# Patient Record
Sex: MALE | Race: WHITE | NOT HISPANIC OR LATINO | Employment: FULL TIME
[De-identification: names, ages, dates, MRNs, and addresses within clinical notes are randomized per-mention and may not be internally consistent; named-entity substitution may affect disease eponyms.]

---

## 2019-10-04 ENCOUNTER — HEALTH MAINTENANCE LETTER (OUTPATIENT)
Age: 49
End: 2019-10-04

## 2020-11-08 ENCOUNTER — HEALTH MAINTENANCE LETTER (OUTPATIENT)
Age: 50
End: 2020-11-08

## 2021-09-11 ENCOUNTER — HEALTH MAINTENANCE LETTER (OUTPATIENT)
Age: 51
End: 2021-09-11

## 2021-12-14 ENCOUNTER — IMMUNIZATION (OUTPATIENT)
Dept: NURSING | Facility: CLINIC | Age: 51
End: 2021-12-14
Payer: COMMERCIAL

## 2021-12-14 PROCEDURE — 91300 PR COVID VAC PFIZER DIL RECON 30 MCG/0.3 ML IM: CPT

## 2021-12-14 PROCEDURE — 90662 IIV NO PRSV INCREASED AG IM: CPT

## 2021-12-14 PROCEDURE — 90471 IMMUNIZATION ADMIN: CPT

## 2021-12-14 PROCEDURE — 0004A PR COVID VAC PFIZER DIL RECON 30 MCG/0.3 ML IM: CPT

## 2022-01-01 ENCOUNTER — HEALTH MAINTENANCE LETTER (OUTPATIENT)
Age: 52
End: 2022-01-01

## 2022-06-03 ENCOUNTER — IMMUNIZATION (OUTPATIENT)
Dept: NURSING | Facility: CLINIC | Age: 52
End: 2022-06-03
Payer: COMMERCIAL

## 2022-06-03 PROCEDURE — 0064A COVID-19,PF,MODERNA (18+ YRS BOOSTER .25ML): CPT

## 2022-06-03 PROCEDURE — 91306 COVID-19,PF,MODERNA (18+ YRS BOOSTER .25ML): CPT

## 2022-09-20 ENCOUNTER — E-VISIT (OUTPATIENT)
Dept: FAMILY MEDICINE | Facility: CLINIC | Age: 52
End: 2022-09-20
Payer: COMMERCIAL

## 2022-09-20 DIAGNOSIS — U07.1 SARS-COV-2 POSITIVE: Primary | ICD-10-CM

## 2022-09-20 PROCEDURE — 99207 PR NON-BILLABLE SERV PER CHARTING: CPT | Performed by: INTERNAL MEDICINE

## 2022-09-20 NOTE — TELEPHONE ENCOUNTER
Provider E-Visit time total (minutes): I spoke to the patient  All symptoms are improving  I explained to them that sometimes people continue to test positive for long time  So we go by his symptoms and not with the test when isolation question comes  As long his it has been more than 5 days and he is fever free for 24 hours without taking any fever lowering medication  Then he can discontinue isolation and wear mask for another 5 days  He is not having any fever and and all his symptoms are mild  This is a post viral syndrome  Dr.Nasima Amalia MD

## 2022-09-20 NOTE — PATIENT INSTRUCTIONS
Dear Tremaine,      Based on your responses, you may have coronavirus (COVID-19).     Will I be tested for COVID-19?  We would like to test you for COVID. I have placed orders for this test.     To schedule: go to your Appear home page and scroll down to the section that says  You have an appointment that needs to be scheduled  and click the large green button that says  Schedule Now  and follow the steps to find the next available openings.    If you are unable to complete these Appear scheduling steps, please call [phone] to schedule your testing.     These guidelines are for isolating before returning to work, school or .     For employers, schools and day cares: This is an official notice for this person s medical guidelines for returning in-person.     For health care sites: The CDC gives different isolation and quarantine guidelines for healthcare sites, please check with these sites before arriving.     How do I self-isolate?  You isolate when you have symptoms of COVID or a test shows you have COVID, even if you don t have symptoms.     If you DO have symptoms:  o Stay home and away from others  - For at least 5 days after your symptoms started, AND   - You are fever free for 24 hours (with no medicine that reduces fever), AND  - Your other symptoms are better.  o Wear a mask for 10 full days any time you are around others.    If you DON T have symptoms:  o Stay at home and away from others for at least 5 days after your positive test.  o Wear a mask for 10 full days any time you are around others.    How can I take care of myself?  Over the counter medications may help with your symptoms such as runny or stuffy nose, cough, chills, or fever.  Talk to your care team about your options.     Some people are at high risk of severe illness (for example, you have a weak immune system, you re 65 years or older, or you have certain medical problems). If your risk is high and your symptoms started in the  last 5 to 7 days, we strongly recommend for you to get COVID treatment as soon as possible. Paxlovid, Molnupiravir and the monoclonal antibody treatments are proven safe and effective, make you feel better faster, and prevent hospitalization and death.       To schedule an appointment to discuss COVID treatment, request an appointment on MyChart (select  COVID-19 Treatment ) or call [phone]), press [phone].      Get lots of rest. Drink extra fluids (unless a doctor has told you not to)    Take Tylenol (acetaminophen) or ibuprofen for fever or pain. If you have liver or kidney problems, ask your family doctor if it's okay to take Tylenol or ibuprofen    Take over the counter medications for your symptoms, as directed by your doctor. You may also talk to your pharmacist.      If you have other health problems (like cancer, heart failure, an organ transplant or severe kidney disease): Call your specialty clinic if you don't feel better in the next 2 days.    Know when to call 911. Emergency warning signs include:  o Trouble breathing or shortness of breath  o Pain or pressure in the chest that doesn't go away  o Feeling confused like you haven't felt before, or not being able to wake up  o Bluish-colored lips or face    Where can I get more information?    [facility] - About COVID-19: [email or web address]     CDC - What to Do If You're Sick: https://www.cdc.gov/coronavirus/2019-ncov/if-you-are-sick/index.html     CDC - Quarantine & Isolation: https://www.cdc.gov/coronavirus/2019-ncov/your-health/quarantine-isolation.html     [facility] clinical trials (COVID-19 research studies): [email or web address]    Below are the COVID-19 hotlines at the [facility] Health ([facility]). Interpreters are available.  o For health questions: Call [phone] or [phone] (7 a.m. to 7 p.m.)  o For questions about schools and childcare: Call [phone] or  [phone] (7 a.m. to 7 p.m.)

## 2022-10-29 ENCOUNTER — HEALTH MAINTENANCE LETTER (OUTPATIENT)
Age: 52
End: 2022-10-29

## 2022-12-03 ASSESSMENT — ENCOUNTER SYMPTOMS
FEVER: 0
WEAKNESS: 0
HEMATURIA: 0
EYE PAIN: 0
FREQUENCY: 0
CONSTIPATION: 0
HEARTBURN: 0
SHORTNESS OF BREATH: 0
PALPITATIONS: 0
DIARRHEA: 0
JOINT SWELLING: 0
HEMATOCHEZIA: 0
CHILLS: 0
DYSURIA: 0
PARESTHESIAS: 0
NERVOUS/ANXIOUS: 0
ARTHRALGIAS: 0
DIZZINESS: 0
HEADACHES: 0
MYALGIAS: 0
ABDOMINAL PAIN: 1
SORE THROAT: 0
COUGH: 0
NAUSEA: 0

## 2022-12-08 ENCOUNTER — OFFICE VISIT (OUTPATIENT)
Dept: FAMILY MEDICINE | Facility: CLINIC | Age: 52
End: 2022-12-08
Payer: COMMERCIAL

## 2022-12-08 VITALS
WEIGHT: 232 LBS | DIASTOLIC BLOOD PRESSURE: 86 MMHG | HEIGHT: 73 IN | HEART RATE: 72 BPM | BODY MASS INDEX: 30.75 KG/M2 | TEMPERATURE: 97.7 F | SYSTOLIC BLOOD PRESSURE: 132 MMHG | OXYGEN SATURATION: 100 % | RESPIRATION RATE: 16 BRPM

## 2022-12-08 DIAGNOSIS — Z00.00 ROUTINE HISTORY AND PHYSICAL EXAMINATION OF ADULT: Primary | ICD-10-CM

## 2022-12-08 DIAGNOSIS — Z11.4 SCREENING FOR HIV (HUMAN IMMUNODEFICIENCY VIRUS): ICD-10-CM

## 2022-12-08 DIAGNOSIS — Z12.11 SCREEN FOR COLON CANCER: ICD-10-CM

## 2022-12-08 DIAGNOSIS — Z11.59 NEED FOR HEPATITIS C SCREENING TEST: ICD-10-CM

## 2022-12-08 DIAGNOSIS — Z13.220 SCREENING FOR HYPERLIPIDEMIA: ICD-10-CM

## 2022-12-08 DIAGNOSIS — E78.5 HYPERLIPIDEMIA LDL GOAL <160: ICD-10-CM

## 2022-12-08 DIAGNOSIS — Z23 INFLUENZA VACCINE NEEDED: ICD-10-CM

## 2022-12-08 LAB
ANION GAP SERPL CALCULATED.3IONS-SCNC: 13 MMOL/L (ref 7–15)
BASOPHILS # BLD AUTO: 0 10E3/UL (ref 0–0.2)
BASOPHILS NFR BLD AUTO: 1 %
BUN SERPL-MCNC: 13.2 MG/DL (ref 6–20)
CALCIUM SERPL-MCNC: 9.7 MG/DL (ref 8.6–10)
CHLORIDE SERPL-SCNC: 104 MMOL/L (ref 98–107)
CHOLEST SERPL-MCNC: 247 MG/DL
CREAT SERPL-MCNC: 1.14 MG/DL (ref 0.67–1.17)
DEPRECATED HCO3 PLAS-SCNC: 23 MMOL/L (ref 22–29)
EOSINOPHIL # BLD AUTO: 0.1 10E3/UL (ref 0–0.7)
EOSINOPHIL NFR BLD AUTO: 1 %
ERYTHROCYTE [DISTWIDTH] IN BLOOD BY AUTOMATED COUNT: 13.3 % (ref 10–15)
GFR SERPL CREATININE-BSD FRML MDRD: 78 ML/MIN/1.73M2
GLUCOSE SERPL-MCNC: 95 MG/DL (ref 70–99)
HBA1C MFR BLD: 5.3 % (ref 0–5.6)
HCT VFR BLD AUTO: 46.5 % (ref 40–53)
HCV AB SERPL QL IA: NONREACTIVE
HDLC SERPL-MCNC: 41 MG/DL
HGB BLD-MCNC: 15.6 G/DL (ref 13.3–17.7)
HIV 1+2 AB+HIV1 P24 AG SERPL QL IA: NONREACTIVE
IMM GRANULOCYTES # BLD: 0 10E3/UL
IMM GRANULOCYTES NFR BLD: 0 %
LDLC SERPL CALC-MCNC: 187 MG/DL
LYMPHOCYTES # BLD AUTO: 1 10E3/UL (ref 0.8–5.3)
LYMPHOCYTES NFR BLD AUTO: 25 %
MCH RBC QN AUTO: 30.2 PG (ref 26.5–33)
MCHC RBC AUTO-ENTMCNC: 33.5 G/DL (ref 31.5–36.5)
MCV RBC AUTO: 90 FL (ref 78–100)
MONOCYTES # BLD AUTO: 0.5 10E3/UL (ref 0–1.3)
MONOCYTES NFR BLD AUTO: 11 %
NEUTROPHILS # BLD AUTO: 2.4 10E3/UL (ref 1.6–8.3)
NEUTROPHILS NFR BLD AUTO: 62 %
NONHDLC SERPL-MCNC: 206 MG/DL
PLATELET # BLD AUTO: 249 10E3/UL (ref 150–450)
POTASSIUM SERPL-SCNC: 4.4 MMOL/L (ref 3.4–5.3)
PSA SERPL-MCNC: 0.84 NG/ML (ref 0–3.5)
RBC # BLD AUTO: 5.16 10E6/UL (ref 4.4–5.9)
SODIUM SERPL-SCNC: 140 MMOL/L (ref 136–145)
TRIGL SERPL-MCNC: 95 MG/DL
TSH SERPL DL<=0.005 MIU/L-ACNC: 1.32 UIU/ML (ref 0.3–4.2)
WBC # BLD AUTO: 4 10E3/UL (ref 4–11)

## 2022-12-08 PROCEDURE — 86803 HEPATITIS C AB TEST: CPT | Performed by: INTERNAL MEDICINE

## 2022-12-08 PROCEDURE — 85025 COMPLETE CBC W/AUTO DIFF WBC: CPT | Performed by: INTERNAL MEDICINE

## 2022-12-08 PROCEDURE — 90682 RIV4 VACC RECOMBINANT DNA IM: CPT | Performed by: INTERNAL MEDICINE

## 2022-12-08 PROCEDURE — G0103 PSA SCREENING: HCPCS | Performed by: INTERNAL MEDICINE

## 2022-12-08 PROCEDURE — 83036 HEMOGLOBIN GLYCOSYLATED A1C: CPT | Performed by: INTERNAL MEDICINE

## 2022-12-08 PROCEDURE — 99386 PREV VISIT NEW AGE 40-64: CPT | Mod: 25 | Performed by: INTERNAL MEDICINE

## 2022-12-08 PROCEDURE — 87389 HIV-1 AG W/HIV-1&-2 AB AG IA: CPT | Performed by: INTERNAL MEDICINE

## 2022-12-08 PROCEDURE — 90471 IMMUNIZATION ADMIN: CPT | Performed by: INTERNAL MEDICINE

## 2022-12-08 PROCEDURE — 80061 LIPID PANEL: CPT | Performed by: INTERNAL MEDICINE

## 2022-12-08 PROCEDURE — 36415 COLL VENOUS BLD VENIPUNCTURE: CPT | Performed by: INTERNAL MEDICINE

## 2022-12-08 PROCEDURE — 84443 ASSAY THYROID STIM HORMONE: CPT | Performed by: INTERNAL MEDICINE

## 2022-12-08 PROCEDURE — 80048 BASIC METABOLIC PNL TOTAL CA: CPT | Performed by: INTERNAL MEDICINE

## 2022-12-08 ASSESSMENT — PAIN SCALES - GENERAL: PAINLEVEL: NO PAIN (0)

## 2022-12-08 NOTE — PROGRESS NOTES
SUBJECTIVE:   CC: Tremaine is an 52 year old who presents for preventative health visit.   Patient has been advised of split billing requirements and indicates understanding: Yes  Healthy Habits:     Getting at least 3 servings of Calcium per day:  Yes    Bi-annual eye exam:  Yes    Dental care twice a year:  Yes    Sleep apnea or symptoms of sleep apnea:  None    Diet:  Regular (no restrictions)    Frequency of exercise:  2-3 days/week    Duration of exercise:  15-30 minutes    Taking medications regularly:  Yes    Medication side effects:  None    PHQ-2 Total Score: 0    Additional concerns today:  No    Ability to successfully perform activities of daily living: Yes, no assistance needed  Home safety:  none identified   Hearing impairment: none      Today's PHQ-2 Score:   PHQ-2 ( 1999 Pfizer) 12/3/2022   Q1: Little interest or pleasure in doing things 0   Q2: Feeling down, depressed or hopeless 0   PHQ-2 Score 0   Q1: Little interest or pleasure in doing things Not at all   Q2: Feeling down, depressed or hopeless Not at all   PHQ-2 Score 0       Have you ever done Advance Care Planning? (For example, a Health Directive, POLST, or a discussion with a medical provider or your loved ones about your wishes): No, advance care planning information given to patient to review.  Patient plans to discuss their wishes with loved ones or provider.      Social History     Tobacco Use     Smoking status: Never     Smokeless tobacco: Never   Substance Use Topics     Alcohol use: Yes     Comment: occasionally     If you drink alcohol do you typically have >3 drinks per day or >7 drinks per week? No    Alcohol Use 12/3/2022   Prescreen: >3 drinks/day or >7 drinks/week? No   Prescreen: >3 drinks/day or >7 drinks/week? -   No flowsheet data found.    Last PSA:   PSA   Date Value Ref Range Status   10/08/2015 0.64 0 - 4 ug/L Final       Reviewed orders with patient. Reviewed health maintenance and updated orders accordingly - Yes  Labs  "reviewed in EPIC    Reviewed and updated as needed this visit by clinical staff                  Reviewed and updated as needed this visit by Provider                 Past Medical History:   Diagnosis Date     FAMILY HX CARDIOVAS DIS NEC 5/6/2006     FAMILY HX PROSTATIC MALIGNANCY 9/14/2007     Lumbago 5/6/2006     Mixed hyperlipidemia 5/6/2006     ORCHITIS/EPIDIDYMIT-MILD-RESOLVED 9/14/2007        Review of Systems  CONSTITUTIONAL: NEGATIVE for fever, chills, change in weight  INTEGUMENTARY/SKIN: NEGATIVE for worrisome rashes, moles or lesions  EYES: NEGATIVE for vision changes or irritation  ENT: NEGATIVE for ear, mouth and throat problems  RESP: NEGATIVE for significant cough or SOB  CV: NEGATIVE for chest pain, palpitations or peripheral edema  GI: NEGATIVE for nausea, abdominal pain, heartburn, or change in bowel habits   male: negative for dysuria, hematuria, decreased urinary stream, erectile dysfunction, urethral discharge  MUSCULOSKELETAL: NEGATIVE for significant arthralgias or myalgia  NEURO: NEGATIVE for weakness, dizziness or paresthesias  PSYCHIATRIC: NEGATIVE for changes in mood or affect    OBJECTIVE:   /86 (BP Location: Right arm, Patient Position: Sitting, Cuff Size: Adult Large)   Pulse 72   Temp 97.7  F (36.5  C) (Temporal)   Resp 16   Ht 1.842 m (6' 0.5\")   Wt 105.2 kg (232 lb)   SpO2 100%   BMI 31.03 kg/m      Physical Exam  GENERAL: alert and no distress  EYES: Eyes grossly normal to inspection, PERRL and conjunctivae and sclerae normal  HENT: ear canals and TM's normal, nose and mouth without ulcers or lesions  NECK: no adenopathy, no asymmetry, masses, or scars and thyroid normal to palpation  RESP: lungs clear to auscultation  CV: regular rate and rhythm  ABDOMEN: soft, nontender, no hepatosplenomegaly, no masses and bowel sounds normal  MS: no gross musculoskeletal defects noted, no edema  SKIN: no suspicious lesions or rashes  NEURO: Normal strength and tone, mentation " intact and speech normal  PSYCH: mentation appears normal, affect normal/bright    Diagnostic Test Results:  Labs reviewed in Epic    ASSESSMENT/PLAN:   Tremaine was seen today for physical.    Diagnoses and all orders for this visit:    Routine history and physical examination of adult  -     REVIEW OF HEALTH MAINTENANCE PROTOCOL ORDERS  -     PSA, screen; Future  -     TSH with free T4 reflex; Future  -     Hemoglobin A1c; Future  -     CBC with platelets and differential; Future  -     Basic metabolic panel  (Ca, Cl, CO2, Creat, Gluc, K, Na, BUN); Future  -     PSA, screen  -     TSH with free T4 reflex  -     Hemoglobin A1c  -     CBC with platelets and differential  -     Basic metabolic panel  (Ca, Cl, CO2, Creat, Gluc, K, Na, BUN)    Screen for colon cancer  -     Fecal colorectal cancer screen (FIT); Future  -     Fecal colorectal cancer screen (FIT)    Screening for HIV (human immunodeficiency virus)  -     HIV Antigen Antibody Combo; Future  -     HIV Antigen Antibody Combo    Need for hepatitis C screening test  -     Hepatitis C Screen Reflex to HCV RNA Quant and Genotype; Future  -     Hepatitis C Screen Reflex to HCV RNA Quant and Genotype    Screening for hyperlipidemia  -     Lipid panel reflex to direct LDL Non-fasting; Future  -     Lipid panel reflex to direct LDL Non-fasting    Hyperlipidemia LDL goal <160  -     Lipid panel reflex to direct LDL Non-fasting; Future  -     Lipid panel reflex to direct LDL Non-fasting    Influenza vaccine needed  -     INFLUENZA VACCINE 50-64 OR 18-64 W/EGG ALLERGY (FLUBLOK)        Patient has been advised of split billing requirements and indicates understanding: Yes      COUNSELING:   Reviewed preventive health counseling, as reflected in patient instructions  Special attention given to:        Regular exercise       Healthy diet/nutrition        He reports that he has never smoked. He has never used smokeless tobacco.        Jenn Herring MD  [facility]  [facility]

## 2023-01-06 ENCOUNTER — IMMUNIZATION (OUTPATIENT)
Dept: NURSING | Facility: CLINIC | Age: 53
End: 2023-01-06
Payer: COMMERCIAL

## 2023-01-06 PROCEDURE — 91312 COVID-19 VACCINE BIVALENT BOOSTER 12+ (PFIZER): CPT

## 2023-01-06 PROCEDURE — 0124A COVID-19 VACCINE BIVALENT BOOSTER 12+ (PFIZER): CPT

## 2023-11-08 ENCOUNTER — PATIENT OUTREACH (OUTPATIENT)
Dept: CARE COORDINATION | Facility: CLINIC | Age: 53
End: 2023-11-08
Payer: COMMERCIAL

## 2024-01-08 ENCOUNTER — IMMUNIZATION (OUTPATIENT)
Dept: NURSING | Facility: CLINIC | Age: 54
End: 2024-01-08
Payer: COMMERCIAL

## 2024-01-08 PROCEDURE — 90480 ADMN SARSCOV2 VAC 1/ONLY CMP: CPT

## 2024-01-08 PROCEDURE — 91320 SARSCV2 VAC 30MCG TRS-SUC IM: CPT

## 2024-01-08 PROCEDURE — 90686 IIV4 VACC NO PRSV 0.5 ML IM: CPT

## 2024-01-08 PROCEDURE — 90471 IMMUNIZATION ADMIN: CPT

## 2024-01-09 ASSESSMENT — ENCOUNTER SYMPTOMS
NAUSEA: 0
MYALGIAS: 0
FREQUENCY: 0
DYSURIA: 0
DIZZINESS: 0
COUGH: 0
WEAKNESS: 0
CHILLS: 0
JOINT SWELLING: 0
ARTHRALGIAS: 0
ABDOMINAL PAIN: 0
NERVOUS/ANXIOUS: 0
HEMATURIA: 0
CONSTIPATION: 0
DIARRHEA: 0
SORE THROAT: 0
PALPITATIONS: 0
PARESTHESIAS: 0
FEVER: 0
SHORTNESS OF BREATH: 0
HEADACHES: 0
HEMATOCHEZIA: 0
EYE PAIN: 0
HEARTBURN: 0

## 2024-01-16 ENCOUNTER — OFFICE VISIT (OUTPATIENT)
Dept: FAMILY MEDICINE | Facility: CLINIC | Age: 54
End: 2024-01-16
Payer: COMMERCIAL

## 2024-01-16 VITALS
WEIGHT: 195.6 LBS | HEIGHT: 72 IN | SYSTOLIC BLOOD PRESSURE: 128 MMHG | BODY MASS INDEX: 26.49 KG/M2 | HEART RATE: 64 BPM | RESPIRATION RATE: 18 BRPM | OXYGEN SATURATION: 99 % | DIASTOLIC BLOOD PRESSURE: 84 MMHG | TEMPERATURE: 97.2 F

## 2024-01-16 DIAGNOSIS — Z79.899 MEDICATION MANAGEMENT: ICD-10-CM

## 2024-01-16 DIAGNOSIS — Z12.5 SCREENING FOR PROSTATE CANCER: ICD-10-CM

## 2024-01-16 DIAGNOSIS — M21.42 FLAT FEET, BILATERAL: ICD-10-CM

## 2024-01-16 DIAGNOSIS — M21.41 FLAT FEET, BILATERAL: ICD-10-CM

## 2024-01-16 DIAGNOSIS — Z13.29 SCREENING FOR THYROID DISORDER: ICD-10-CM

## 2024-01-16 DIAGNOSIS — Z13.0 SCREENING FOR DEFICIENCY ANEMIA: ICD-10-CM

## 2024-01-16 DIAGNOSIS — Z23 NEED FOR VACCINATION: ICD-10-CM

## 2024-01-16 DIAGNOSIS — R03.0 ELEVATED BP WITHOUT DIAGNOSIS OF HYPERTENSION: ICD-10-CM

## 2024-01-16 DIAGNOSIS — D22.9 NUMEROUS MOLES: ICD-10-CM

## 2024-01-16 DIAGNOSIS — E78.5 HYPERLIPIDEMIA, UNSPECIFIED HYPERLIPIDEMIA TYPE: ICD-10-CM

## 2024-01-16 DIAGNOSIS — Z12.11 SCREEN FOR COLON CANCER: ICD-10-CM

## 2024-01-16 DIAGNOSIS — Z00.00 ROUTINE GENERAL MEDICAL EXAMINATION AT A HEALTH CARE FACILITY: Primary | ICD-10-CM

## 2024-01-16 DIAGNOSIS — Z80.42 FAMILY HISTORY OF MALIGNANT NEOPLASM OF PROSTATE: ICD-10-CM

## 2024-01-16 DIAGNOSIS — Z82.49 FAMILY HISTORY OF ISCHEMIC HEART DISEASE: ICD-10-CM

## 2024-01-16 LAB
ERYTHROCYTE [DISTWIDTH] IN BLOOD BY AUTOMATED COUNT: 13 % (ref 10–15)
HCT VFR BLD AUTO: 42.5 % (ref 40–53)
HGB BLD-MCNC: 14.1 G/DL (ref 13.3–17.7)
MCH RBC QN AUTO: 30.1 PG (ref 26.5–33)
MCHC RBC AUTO-ENTMCNC: 33.2 G/DL (ref 31.5–36.5)
MCV RBC AUTO: 91 FL (ref 78–100)
PLATELET # BLD AUTO: 238 10E3/UL (ref 150–450)
RBC # BLD AUTO: 4.69 10E6/UL (ref 4.4–5.9)
WBC # BLD AUTO: 5.3 10E3/UL (ref 4–11)

## 2024-01-16 PROCEDURE — 36415 COLL VENOUS BLD VENIPUNCTURE: CPT | Performed by: INTERNAL MEDICINE

## 2024-01-16 PROCEDURE — 90746 HEPB VACCINE 3 DOSE ADULT IM: CPT | Performed by: INTERNAL MEDICINE

## 2024-01-16 PROCEDURE — 85027 COMPLETE CBC AUTOMATED: CPT | Performed by: INTERNAL MEDICINE

## 2024-01-16 PROCEDURE — 80053 COMPREHEN METABOLIC PANEL: CPT | Performed by: INTERNAL MEDICINE

## 2024-01-16 PROCEDURE — 99214 OFFICE O/P EST MOD 30 MIN: CPT | Mod: 25 | Performed by: INTERNAL MEDICINE

## 2024-01-16 PROCEDURE — 84443 ASSAY THYROID STIM HORMONE: CPT | Performed by: INTERNAL MEDICINE

## 2024-01-16 PROCEDURE — 80061 LIPID PANEL: CPT | Performed by: INTERNAL MEDICINE

## 2024-01-16 PROCEDURE — 99396 PREV VISIT EST AGE 40-64: CPT | Mod: 25 | Performed by: INTERNAL MEDICINE

## 2024-01-16 PROCEDURE — G0103 PSA SCREENING: HCPCS | Performed by: INTERNAL MEDICINE

## 2024-01-16 PROCEDURE — 90471 IMMUNIZATION ADMIN: CPT | Performed by: INTERNAL MEDICINE

## 2024-01-16 ASSESSMENT — ENCOUNTER SYMPTOMS
ARTHRALGIAS: 0
SHORTNESS OF BREATH: 0
EYE PAIN: 0
ABDOMINAL PAIN: 0
HEMATOCHEZIA: 0
COUGH: 0
SORE THROAT: 0
WEAKNESS: 0
DIARRHEA: 0
DIZZINESS: 0
FEVER: 0
HEMATURIA: 0
HEARTBURN: 0
JOINT SWELLING: 0
DYSURIA: 0
PARESTHESIAS: 0
CONSTIPATION: 0
FREQUENCY: 0
MYALGIAS: 0
CHILLS: 0
NERVOUS/ANXIOUS: 0
NAUSEA: 0
HEADACHES: 0
PALPITATIONS: 0

## 2024-01-16 ASSESSMENT — PAIN SCALES - GENERAL: PAINLEVEL: NO PAIN (0)

## 2024-01-16 NOTE — NURSING NOTE
Prior to immunization administration, verified patients identity using patient s name and date of birth. Please see Immunization Activity for additional information.     Screening Questionnaire for Adult Immunization    Are you sick today?   No   Do you have allergies to medications, food, a vaccine component or latex?   No   Have you ever had a serious reaction after receiving a vaccination?   No   Do you have a long-term health problem with heart, lung, kidney, or metabolic disease (e.g., diabetes), asthma, a blood disorder, no spleen, complement component deficiency, a cochlear implant, or a spinal fluid leak?  Are you on long-term aspirin therapy?   No   Do you have cancer, leukemia, HIV/AIDS, or any other immune system problem?   No   Do you have a parent, brother, or sister with an immune system problem?   No   In the past 3 months, have you taken medications that affect  your immune system, such as prednisone, other steroids, or anticancer drugs; drugs for the treatment of rheumatoid arthritis, Crohn s disease, or psoriasis; or have you had radiation treatments?   No   Have you had a seizure, or a brain or other nervous system problem?   No   During the past year, have you received a transfusion of blood or blood    products, or been given immune (gamma) globulin or antiviral drug?   No   For women: Are you pregnant or is there a chance you could become       pregnant during the next month?   No   Have you received any vaccinations in the past 4 weeks?   Yes     Immunization questionnaire was positive for at least one answer.  Notified .      Patient instructed to remain in clinic for 15 minutes afterwards, and to report any adverse reactions.     Screening performed by Fannie Mosqueda MA on 1/16/2024 at 3:00 PM.

## 2024-01-16 NOTE — PATIENT INSTRUCTIONS
Schedule colonoscopy at your earliest convenience by calling the following number:  Nga  :[phone].    There is a new shingles vaccine available called shingrex  It is a series of 2 shots 2-6 months apart.  Considered more than 90% effective.  Please go to any pharmacy to get the  vaccine    Monitor your blood pressure once a week  at home.  Bring those readings on your next visit.  Notify us if your blood pressure readings consistently stays greater than 140/90.       Please call [place] kristian  [phone] to schedule coronary calcium scoring test     Follow up in one year for physical   Seek sooner medical attention if there is any worsening of symptoms or problems.       Preventive Health Recommendations  Male Ages 50 - 64    Yearly exam:             See your health care provider every year in order to  o   Review health changes.   o   Discuss preventive care.    o   Review your medicines if your doctor has prescribed any.   Have a cholesterol test every 5 years, or more frequently if you are at risk for high cholesterol/heart disease.   Have a diabetes test (fasting glucose) every three years. If you are at risk for diabetes, you should have this test more often.   Have a colonoscopy at age 45, or have a yearly FIT test (stool test). These exams will check for colon cancer.    Talk with your health care provider about whether or not a prostate cancer screening test (PSA) is right for you.  You should be tested each year for STDs (sexually transmitted diseases), if you re at risk.     Shots: Get a flu shot each year. Get a tetanus shot every 10 years.     Nutrition:  Eat at least 5 servings of fruits and vegetables daily.   Eat whole-grain bread, whole-wheat pasta and brown rice instead of white grains and rice.   Get adequate Calcium and Vitamin D.     Lifestyle  Exercise for at least 150 minutes a week (30 minutes a day, 5 days a week). This will help you control your weight and  prevent disease.   Limit alcohol to one drink per day.   No smoking.   Wear sunscreen to prevent skin cancer.   See your dentist every six months for an exam and cleaning.   See your eye doctor every 1 to 2 years.

## 2024-01-16 NOTE — PROGRESS NOTES
SUBJECTIVE:   Tremaine is a 53 year old, presenting for the following:  Physical    Healthy Habits:     Getting at least 3 servings of Calcium per day:  Yes    Bi-annual eye exam:  Yes    Dental care twice a year:  Yes    Sleep apnea or symptoms of sleep apnea:  None    Diet:  Regular (no restrictions)    Frequency of exercise:  6-7 days/week    Duration of exercise:  45-60 minutes    Taking medications regularly:  Yes    Medication side effects:  None    Additional concerns today:  No      Today's PHQ-2 Score:       1/15/2024     2:50 PM   PHQ-2 ( 1999 Pfizer)   Q1: Little interest or pleasure in doing things 0   Q2: Feeling down, depressed or hopeless 0   PHQ-2 Score 0   Q1: Little interest or pleasure in doing things Not at all   Q2: Feeling down, depressed or hopeless Not at all   PHQ-2 Score 0         Social History     Tobacco Use    Smoking status: Never    Smokeless tobacco: Never   Substance Use Topics    Alcohol use: Yes     Comment: occasionally             1/9/2024    10:07 PM   Alcohol Use   Prescreen: >3 drinks/day or >7 drinks/week? No       Last PSA:   PSA   Date Value Ref Range Status   10/08/2015 0.64 0 - 4 ug/L Final     Prostate Specific Antigen Screen   Date Value Ref Range Status   12/08/2022 0.84 0.00 - 3.50 ng/mL Final       Reviewed orders with patient. Reviewed health maintenance and updated orders accordingly - Yes  Labs reviewed in EPIC    Reviewed and updated as needed this visit by clinical staff   Tobacco  Allergies  Meds              Reviewed and updated as needed this visit by Provider                     Review of Systems   Constitutional:  Negative for chills and fever.   HENT:  Negative for congestion, ear pain, hearing loss and sore throat.    Eyes:  Negative for pain and visual disturbance.   Respiratory:  Negative for cough and shortness of breath.    Cardiovascular:  Negative for chest pain, palpitations and peripheral edema.   Gastrointestinal:  Negative for abdominal pain,  "constipation, diarrhea, heartburn, hematochezia and nausea.   Genitourinary:  Negative for dysuria, frequency, genital sores, hematuria, impotence, penile discharge and urgency.   Musculoskeletal:  Negative for arthralgias, joint swelling and myalgias.   Skin:  Negative for rash.   Neurological:  Negative for dizziness, weakness, headaches and paresthesias.   Psychiatric/Behavioral:  Negative for mood changes. The patient is not nervous/anxious.          OBJECTIVE:   /84 (BP Location: Right arm, Patient Position: Sitting)   Pulse 64   Temp 97.2  F (36.2  C) (Temporal)   Resp 18   Ht 1.825 m (5' 11.85\")   Wt 88.7 kg (195 lb 9.6 oz)   SpO2 99%   BMI 26.64 kg/m      Physical Exam  GENERAL: healthy, alert and no distress  EYES: Eyes grossly normal to inspection, PERRL and conjunctivae and sclerae normal  HENT: ear canals and TM's normal, nose and mouth without ulcers or lesions  NECK: no adenopathy, no asymmetry, masses, or scars and thyroid normal to palpation  RESP: lungs clear to auscultation - no rales, rhonchi or wheezes  CV: regular rate and rhythm, normal S1 S2, no S3 or S4, no murmur, click or rub, no peripheral edema and peripheral pulses strong  ABDOMEN: soft, nontender, no hepatosplenomegaly, no masses and bowel sounds normal   (male): normal male genitalia without lesions or urethral discharge, no hernia  PROSTATE: not tender, enlarged, boggy, or nodular.  MS: no gross musculoskeletal defects noted, no edema  SKIN: no suspicious lesions or rashes seborrheic  keratosis lesions, moles and freckles  NEURO: Normal strength and tone, mentation intact and speech normal  PSYCH: mentation appears normal, affect normal/bright    Diagnostic Test Results:  Labs reviewed in Epic  Labs pending    ASSESSMENT/PLAN:   Tremaine was seen today for physical.    Diagnoses and all orders for this visit:    Routine general medical examination at a health care facility  Counseled on diet and exercise. Patient confirmed " he has been walking a lot and actively trying to lose weight.  Patient confirmed he takes multivitamin.  Patient received Hep B vaccine today and he will receive shingles vaccine at a pharmacy.    Screen for colon cancer  -     Colonoscopy Screening  Referral; Future  Referred for colonoscopy d/t age, patient will call to schedule.    Family history of malignant neoplasm of prostate  Comments:  father  Fhx father had prostate cancer at 62 L/W. Discussed importance of yearly prostate screenings.    Flat feet, bilateral  Patient has flat feet     Screening for thyroid disorder  TSH     Screening for deficiency anemia  -     CBC with platelets; Future    Screening for prostate cancer  -     Prostate Specific Antigen Screen; Future    Hyperlipidemia, unspecified hyperlipidemia type  -     Lipid panel reflex to direct LDL Fasting; Future  -     CT Coronary Calcium Scan; Future  Sending rx after lab results.    Medication management  -     Comprehensive metabolic panel; Future    Elevated BP without diagnosis of hypertension  BP elevated at 141/88, rechecked at 128/84. Advised to start monitoring BP at home.  Fhx father has h.o high BP    Family history of ischemic heart disease  -     CT Coronary Calcium Scan; Future  Fhx father has CAD L/W, paternal grandfather passed away from MI. Referred for CT coronary calcium scan, he will call to schedule.    Numerous moles  -     Adult Dermatology  Referral; Future  Referred to dermatology for cancer screening. Advised to wear sunscreen while outside.    Other orders  -     REVIEW OF HEALTH MAINTENANCE PROTOCOL ORDERS  -     PRIMARY CARE FOLLOW-UP SCHEDULING; Future    Patient has been advised of split billing requirements and indicates understanding: Yes      COUNSELING:   Reviewed preventive health counseling, as reflected in patient instructions  Special attention given to:        Regular exercise       Healthy diet/nutrition       Immunizations  Vaccinated  for: Hepatitis B         Colorectal cancer screening       Prostate cancer screening    He reports that he has never smoked. He has never used smokeless tobacco.          Carine Holland MD  [facility]    This document serves as a record of the services and decisions personally performed and made by Dr. Holland. It was created on her behalf by Rosemarie Minor, a trained medical scribe. The creation of this document is based the provider's statements to the medical scribe.

## 2024-01-17 LAB
ALBUMIN SERPL BCG-MCNC: 4.4 G/DL (ref 3.5–5.2)
ALP SERPL-CCNC: 85 U/L (ref 40–150)
ALT SERPL W P-5'-P-CCNC: 19 U/L (ref 0–70)
ANION GAP SERPL CALCULATED.3IONS-SCNC: 10 MMOL/L (ref 7–15)
AST SERPL W P-5'-P-CCNC: 21 U/L (ref 0–45)
BILIRUB SERPL-MCNC: 0.7 MG/DL
BUN SERPL-MCNC: 11.1 MG/DL (ref 6–20)
CALCIUM SERPL-MCNC: 9.5 MG/DL (ref 8.6–10)
CHLORIDE SERPL-SCNC: 102 MMOL/L (ref 98–107)
CHOLEST SERPL-MCNC: 219 MG/DL
CREAT SERPL-MCNC: 0.91 MG/DL (ref 0.67–1.17)
DEPRECATED HCO3 PLAS-SCNC: 27 MMOL/L (ref 22–29)
EGFRCR SERPLBLD CKD-EPI 2021: >90 ML/MIN/1.73M2
FASTING STATUS PATIENT QL REPORTED: YES
GLUCOSE SERPL-MCNC: 82 MG/DL (ref 70–99)
HDLC SERPL-MCNC: 44 MG/DL
LDLC SERPL CALC-MCNC: 153 MG/DL
NONHDLC SERPL-MCNC: 175 MG/DL
POTASSIUM SERPL-SCNC: 4.1 MMOL/L (ref 3.4–5.3)
PROT SERPL-MCNC: 7.4 G/DL (ref 6.4–8.3)
PSA SERPL DL<=0.01 NG/ML-MCNC: 1.08 NG/ML (ref 0–3.5)
SODIUM SERPL-SCNC: 139 MMOL/L (ref 135–145)
TRIGL SERPL-MCNC: 108 MG/DL
TSH SERPL DL<=0.005 MIU/L-ACNC: 1.49 UIU/ML (ref 0.3–4.2)

## 2024-01-17 RX ORDER — ROSUVASTATIN CALCIUM 10 MG/1
10 TABLET, COATED ORAL DAILY
Qty: 90 TABLET | Refills: 1 | Status: SHIPPED | OUTPATIENT
Start: 2024-01-17 | End: 2024-07-12

## 2024-01-18 NOTE — RESULT ENCOUNTER NOTE
Gradyelliott Penaloza    This is to inform you regarding your test result.      Your total cholesterol is elevated.  HDL which is called good cholesterol is normal.  Your LDL which is called bad cholesterol is elevated.  Eat low cholesterol low fat  diet and do regular physical activity. Avoid high sugar containing food.  Start taking cholesterol lowering medication crestor.  Script is sent to pharmacy.  Recheck lipids in 2 months  Orders are placed  Make lab only appointment .  Your PSA (prostate cancer screening test)  is normal.  TSH which is thyroid hormone is normal.  The testing of your blood sugar, kidney function, liver function and electrolytes was normal.  CBC result which includes white count Hemoglobin and  Platelet Counts is normal.           Sincerely,      Dr.Nasima Amalia MD,FACP

## 2024-01-26 ENCOUNTER — TELEPHONE (OUTPATIENT)
Dept: GASTROENTEROLOGY | Facility: CLINIC | Age: 54
End: 2024-01-26
Payer: COMMERCIAL

## 2024-01-26 NOTE — TELEPHONE ENCOUNTER
"Endoscopy Scheduling Screen    Have you had a positive Covid test in the last 14 days?  No    Are you active on MyChart?   Yes    What insurance is in the chart?  Other:  BC[employer]    Ordering/Referring Provider:  RACHAEL CM   (If ordering provider performs procedure, schedule with ordering provider unless otherwise instructed. )    BMI: Estimated body mass index is 26.64 kg/m  as calculated from the following:    Height as of 1/16/24: 1.825 m (5' 11.85\").    Weight as of 1/16/24: 88.7 kg (195 lb 9.6 oz).     Sedation Ordered  moderate sedation.   If patient BMI > 50 do not schedule in [facility].    If patient BMI > 45 do not schedule at [facility].    Are you taking methadone or Suboxone?  No    Have you had difficulties, pain, or discomfort during past endoscopy procedures?  No    Are you taking any prescription medications for pain 3 or more times per week?   NO - No RN review required.    Do you have a history of malignant hyperthermia or adverse reaction to anesthesia?  No     Have you been diagnosed or told you have pulmonary hypertension?   No    Do you have an LVAD?  No    Have you been told you have moderate to severe sleep apnea?  No    Have you been told you have COPD, asthma, or any other lung disease?  No    Do you have any heart conditions?  No     Have you ever had an organ transplant?   No    Have you ever had or are you awaiting a heart or lung transplant?   No    Have you had a stroke or transient ischemic attack (TIA aka \"mini stroke\" in the last 6 months?   No    Have you been diagnosed with or been told you have cirrhosis of the liver?   No    Are you currently on dialysis?   No    Do you need assistance transferring?   No    BMI: Estimated body mass index is 26.64 kg/m  as calculated from the following:    Height as of 1/16/24: 1.825 m (5' 11.85\").    Weight as of 1/16/24: 88.7 kg (195 lb 9.6 oz).     Is patients BMI > 40 and scheduling location [facility]?  No    Do you take an injectable medication for weight " loss or diabetes (excluding insulin)?  No    Do you take the medication Naltrexone?  No    Do you take blood thinners?  No       Prep   Are you currently on dialysis or do you have chronic kidney disease?  No    Do you have a diagnosis of diabetes?  No    Do you have a diagnosis of cystic fibrosis (CF)?  No    On a regular basis do you go 3 -5 days between bowel movements?  No    BMI > 40?  No    Preferred Pharmacy:  [id number] [address] [place] [facility] [address]  [facility] [address]  Phone: [phone] Fax: [phone]      Final Scheduling Details   Colonoscopy prep sent?  Standard MiraLAX    Procedure scheduled  Colonoscopy    Surgeon:  VICTORIANO     Date of procedure:  3/4/2024     Pre-OP / PAC:   No - Not required for this site.    Location  [facility] - Per order.    Sedation   Moderate Sedation - Per order.      Patient Reminders:   You will receive a call from a Nurse to review instructions and health history.  This assessment must be completed prior to your procedure.  Failure to complete the Nurse assessment may result in the procedure being cancelled.      On the day of your procedure, please designate an adult(s) who can drive you home stay with you for the next 24 hours. The medicines used in the exam will make you sleepy. You will not be able to drive.      You cannot take public transportation, ride share services, or non-medical taxi service without a responsible caregiver.  Medical transport services are allowed with the requirement that a responsible caregiver will receive you at your destination.  We require that drivers and caregivers are confirmed prior to your procedure.

## 2024-02-08 ENCOUNTER — HOSPITAL ENCOUNTER (OUTPATIENT)
Dept: CARDIOLOGY | Facility: CLINIC | Age: 54
Discharge: HOME | End: 2024-02-08
Attending: INTERNAL MEDICINE | Admitting: INTERNAL MEDICINE
Payer: COMMERCIAL

## 2024-02-08 DIAGNOSIS — Z82.49 FAMILY HISTORY OF ISCHEMIC HEART DISEASE: ICD-10-CM

## 2024-02-08 DIAGNOSIS — E78.5 HYPERLIPIDEMIA, UNSPECIFIED HYPERLIPIDEMIA TYPE: ICD-10-CM

## 2024-02-08 PROBLEM — I77.810 ASCENDING AORTA DILATATION (H): Status: ACTIVE | Noted: 2024-02-08

## 2024-02-08 PROCEDURE — 75571 CT HRT W/O DYE W/CA TEST: CPT

## 2024-02-08 PROCEDURE — 75571 CT HRT W/O DYE W/CA TEST: CPT | Mod: 26 | Performed by: INTERNAL MEDICINE

## 2024-02-09 ENCOUNTER — MYC MEDICAL ADVICE (OUTPATIENT)
Dept: FAMILY MEDICINE | Facility: CLINIC | Age: 54
End: 2024-02-09
Payer: COMMERCIAL

## 2024-02-09 NOTE — RESULT ENCOUNTER NOTE
Luiz Penaloza    This is to inform you regarding your test result.    Coronary calcium score is 0  This is great news  continue to eat healthy and do regular physical activity  There is incidental finding that   Dilated ascending aorta measuring 4 cm  This will need ongoing monitoring to make sure size is not getting bigger  Please make a note of it   We should do Echocardiogram for follow up in one year  Please remind us at the time of next physical so this can be ordered .      Sincerely,      Dr.Nasima Amalia MD,FACP

## 2024-02-09 NOTE — TELEPHONE ENCOUNTER
Spoke to patient and explained result   Answered his question   Will need Echocardiogram in one year for follow up of ascending aorta dilatation  He was appreciative of phone call and made a note of this   Dr.Nasima Amalia MD

## 2024-02-16 ENCOUNTER — TELEPHONE (OUTPATIENT)
Dept: GASTROENTEROLOGY | Facility: CLINIC | Age: 54
End: 2024-02-16

## 2024-02-16 ENCOUNTER — ALLIED HEALTH/NURSE VISIT (OUTPATIENT)
Dept: FAMILY MEDICINE | Facility: CLINIC | Age: 54
End: 2024-02-16
Payer: COMMERCIAL

## 2024-02-16 DIAGNOSIS — Z23 NEED FOR VACCINATION: Primary | ICD-10-CM

## 2024-02-16 PROCEDURE — 90746 HEPB VACCINE 3 DOSE ADULT IM: CPT

## 2024-02-16 PROCEDURE — 99207 PR NO CHARGE NURSE ONLY: CPT

## 2024-02-16 PROCEDURE — 90471 IMMUNIZATION ADMIN: CPT

## 2024-02-16 NOTE — NURSING NOTE
Prior to immunization administration, verified patients identity using patient s name and date of birth. Please see Immunization Activity for additional information.     Screening Questionnaire for Adult Immunization    Are you sick today?   No   Do you have allergies to medications, food, a vaccine component or latex?   No   Have you ever had a serious reaction after receiving a vaccination?   No   Do you have a long-term health problem with heart, lung, kidney, or metabolic disease (e.g., diabetes), asthma, a blood disorder, no spleen, complement component deficiency, a cochlear implant, or a spinal fluid leak?  Are you on long-term aspirin therapy?   No   Do you have cancer, leukemia, HIV/AIDS, or any other immune system problem?   No   Do you have a parent, brother, or sister with an immune system problem?   No   In the past 3 months, have you taken medications that affect  your immune system, such as prednisone, other steroids, or anticancer drugs; drugs for the treatment of rheumatoid arthritis, Crohn s disease, or psoriasis; or have you had radiation treatments?   No   Have you had a seizure, or a brain or other nervous system problem?   No   During the past year, have you received a transfusion of blood or blood    products, or been given immune (gamma) globulin or antiviral drug?   No   For women: Are you pregnant or is there a chance you could become       pregnant during the next month?   No   Have you received any vaccinations in the past 4 weeks?   No     Immunization questionnaire answers were all negative.    I have reviewed the following standing orders:   This patient is due and qualifies for the Hepatitis B vaccine.    Click here for Hepatitis B Standing Order    I have reviewed the vaccines inclusion and exclusion criteria; No concerns regarding eligibility.     Patient instructed to remain in clinic for 15 minutes afterwards, and to report any adverse reactions.     Screening performed by Fuad  FRIDA West on 2/16/2024 at 1:21 PM.

## 2024-02-16 NOTE — TELEPHONE ENCOUNTER
Pre visit planning completed.      Procedure details:    Patient scheduled for Colonoscopy  on 3/4/2024.     Arrival time: 0815. Procedure time 0900    Pre op exam needed? N/A    Facility location: [facility]; [facility] [address] [address]    Sedation type: Conscious sedation     Indication for procedure: Screening         Chart review:     Electronic implanted devices? No    Recent diagnosis of diverticulitis within the last 6 weeks? No    Diabetic? No        Medication review:    Anticoagulants? No    NSAIDS? No NSAID medications per patient's medication list.  RN will verify with pre-assessment call.    Other medication HOLDING recommendations:  Multivitamin w/iron supplement: HOLD for 7 days       Prep for procedure:     Bowel prep recommendation: Standard Miralax   Due to:  standard bowel prep.    Prep instructions sent via [employer]     Johanny Patel RN  Endoscopy Procedure Pre Assessment RN  [phone] option 4

## 2024-02-19 NOTE — TELEPHONE ENCOUNTER
Attempted to contact patient in order to complete pre assessment questions.     No answer. Left message to return call to [phone] option 4    Missed call communication sent via [employer].    Charissa Covarrubias RN

## 2024-02-21 NOTE — TELEPHONE ENCOUNTER
Pre assessment completed for upcoming procedure.   (Please see previous telephone encounter notes for complete details)    Patient  returned call.       Procedure details:    Arrival time and facility location reviewed.    Pre op exam needed? N/A    Designated  policy reviewed. Instructed to have someone stay 6  hours post procedure.     COVID policy reviewed.      Medication review:    Medications reviewed. Please see supporting documentation below. Holding recommendations discussed (if applicable).   N/A      Prep for procedure:     Procedure prep instructions reviewed.        Any additional information needed:  N/A      Patient  verbalized understanding and had no questions or concerns at this time.      Esperanza Bledsoe RN  Endoscopy Procedure Pre Assessment RN  [phone] option 4

## 2024-03-04 ENCOUNTER — HOSPITAL ENCOUNTER (OUTPATIENT)
Facility: CLINIC | Age: 54
Discharge: HOME | End: 2024-03-04
Attending: COLON & RECTAL SURGERY | Admitting: COLON & RECTAL SURGERY
Payer: COMMERCIAL

## 2024-03-04 VITALS
DIASTOLIC BLOOD PRESSURE: 65 MMHG | HEART RATE: 85 BPM | WEIGHT: 195 LBS | SYSTOLIC BLOOD PRESSURE: 104 MMHG | RESPIRATION RATE: 14 BRPM | HEIGHT: 72 IN | OXYGEN SATURATION: 98 % | BODY MASS INDEX: 26.41 KG/M2

## 2024-03-04 LAB — COLONOSCOPY: NORMAL

## 2024-03-04 PROCEDURE — 88305 TISSUE EXAM BY PATHOLOGIST: CPT | Mod: 26 | Performed by: PATHOLOGY

## 2024-03-04 PROCEDURE — 45385 COLONOSCOPY W/LESION REMOVAL: CPT | Mod: PT | Performed by: COLON & RECTAL SURGERY

## 2024-03-04 PROCEDURE — 88305 TISSUE EXAM BY PATHOLOGIST: CPT | Mod: TC | Performed by: COLON & RECTAL SURGERY

## 2024-03-04 PROCEDURE — G0500 MOD SEDAT ENDO SERVICE >5YRS: HCPCS | Performed by: COLON & RECTAL SURGERY

## 2024-03-04 PROCEDURE — 250N000011 HC RX IP 250 OP 636: Performed by: COLON & RECTAL SURGERY

## 2024-03-04 RX ORDER — LIDOCAINE 40 MG/G
CREAM TOPICAL
Status: DISCONTINUED | OUTPATIENT
Start: 2024-03-04 | End: 2024-03-04 | Stop reason: HOSPADM

## 2024-03-04 RX ORDER — ONDANSETRON 2 MG/ML
4 INJECTION INTRAMUSCULAR; INTRAVENOUS
Status: DISCONTINUED | OUTPATIENT
Start: 2024-03-04 | End: 2024-03-04 | Stop reason: HOSPADM

## 2024-03-04 RX ORDER — FENTANYL CITRATE 50 UG/ML
INJECTION, SOLUTION INTRAMUSCULAR; INTRAVENOUS PRN
Status: DISCONTINUED | OUTPATIENT
Start: 2024-03-04 | End: 2024-03-04 | Stop reason: HOSPADM

## 2024-03-04 ASSESSMENT — ACTIVITIES OF DAILY LIVING (ADL)
ADLS_ACUITY_SCORE: 35
ADLS_ACUITY_SCORE: 35

## 2024-03-04 NOTE — H&P
Colon & Rectal Surgery History and Physical  Pre-Endoscopy Procedure Note    History of Present Illness   I have been asked by Dr. Holland to evaluate this 53 year old male for colorectal cancer screening. He currently denies any abdominal pain, weight loss, bleeding per rectum, or recent change in bowel habits.    Past Medical History  Diagnosis Date    Lumbago 5/6/2006    Mixed hyperlipidemia 5/6/2006    ORCHITIS/EPIDIDYMIT-MILD-RESOLVED 9/14/2007       Past Surgical History  Procedure Laterality Date    TOOTH EXTRACTION W/FORCEP  age 17    no bleeding        Medications  Medications Prior to Admission   Medication Sig    rosuvastatin (CRESTOR) 10 MG tablet Take 1 tablet (10 mg) by mouth daily for cholesterol       Allergies  Allergen Reactions    No Known Allergies         Family History   Family history includes Alzheimer Disease in his maternal grandmother; Breast Cancer in his mother; C.A.D. in his father; Cancer in his maternal grandmother; Heart Disease in his paternal grandfather; Hypertension in his father; Prostate Cancer in his father.     Social History   He reports that he has never smoked. He has never used smokeless tobacco. He reports current alcohol use. He reports that he does not use drugs.    Review of Systems   Constitutional:  No fever, weight change or fatigue.    Eyes:     No dry eyes or vision changes.   Ears/Nose/Throat/Neck:  No oral ulcers, sore throat or voice change.    Cardiovascular:   No palpitations, syncope, angina or edema.   Respiratory:    No chest pain, excessive sleepiness, shortness of breath or hemoptysis.    Gastrointestinal:   No abdominal pain, nausea, vomiting, diarrhea or heartburn.    Genitourinary:   No dysuria, hematuria, urinary retention or urinary frequency.   Musculoskeletal:  No joint swelling or arthralgias.    Dermatologic:  No skin rash or other skin changes.   Neurologic:    No focal weakness or numbness. No neuropathy.   Psychiatric:    No depression,  anxiety, suicidal ideation, or paranoid ideation.   Endocrine:   No cold or heat intolerance, polydipsia, hirsutism, change in libido, or flushing.   Hematology/Lymphatic:  No bleeding or lymphadenopathy.    Allergy/Immunology:  No rhinitis or hives.     Physical Exam   Vitals:  Blood pressure 139/90, pulse 83, resp. rate 16, height 1.829 m (6'), weight 88.5 kg (195 lb), SpO2 100%.    General:  Alert and oriented to person, place and time   Airway: Normal oropharyngeal airway and neck mobility   Lungs:  Clear bilaterally   Heart:  Regular rate and rhythm   Abdomen: Soft, NT, ND, no masses   Extremities: Warm, good capillary refill    ASA Grade: II (mild systemic disease)    Impression: Cleared for use of conscious sedation for colorectal cancer screening    Plan: Proceed with colonoscopy     Vane Ulloa MD  [facility]  [phone]

## 2024-03-05 LAB
PATH REPORT.COMMENTS IMP SPEC: NORMAL
PATH REPORT.COMMENTS IMP SPEC: NORMAL
PATH REPORT.FINAL DX SPEC: NORMAL
PATH REPORT.GROSS SPEC: NORMAL
PATH REPORT.MICROSCOPIC SPEC OTHER STN: NORMAL
PATH REPORT.RELEVANT HX SPEC: NORMAL
PHOTO IMAGE: NORMAL

## 2024-03-18 ENCOUNTER — LAB (OUTPATIENT)
Dept: LAB | Facility: CLINIC | Age: 54
End: 2024-03-18
Payer: COMMERCIAL

## 2024-03-18 ENCOUNTER — PATIENT OUTREACH (OUTPATIENT)
Dept: GASTROENTEROLOGY | Facility: CLINIC | Age: 54
End: 2024-03-18

## 2024-03-18 DIAGNOSIS — E78.5 HYPERLIPIDEMIA, UNSPECIFIED HYPERLIPIDEMIA TYPE: ICD-10-CM

## 2024-03-18 LAB
CHOLEST SERPL-MCNC: 180 MG/DL
FASTING STATUS PATIENT QL REPORTED: YES
HDLC SERPL-MCNC: 53 MG/DL
LDLC SERPL CALC-MCNC: 105 MG/DL
NONHDLC SERPL-MCNC: 127 MG/DL
TRIGL SERPL-MCNC: 112 MG/DL

## 2024-03-18 PROCEDURE — 36415 COLL VENOUS BLD VENIPUNCTURE: CPT

## 2024-03-18 PROCEDURE — 80061 LIPID PANEL: CPT

## 2024-03-19 NOTE — RESULT ENCOUNTER NOTE
Luiz Penaloza    This is to inform you regarding your test result.    Your total cholesterol is normal.  HDL which is called good cholesterol is normal.  Your LDL cholesterol is elevated .  Your triglycerides are normal.  Eat low cholesterol low fat  diet and do regular physical activity.  Continue crestor      Sincerely,      Dr.Nasima Amalia MD,FACP

## 2024-04-14 ENCOUNTER — MYC REFILL (OUTPATIENT)
Dept: FAMILY MEDICINE | Facility: CLINIC | Age: 54
End: 2024-04-14
Payer: COMMERCIAL

## 2024-04-14 DIAGNOSIS — E78.5 HYPERLIPIDEMIA, UNSPECIFIED HYPERLIPIDEMIA TYPE: ICD-10-CM

## 2024-04-15 RX ORDER — ROSUVASTATIN CALCIUM 10 MG/1
10 TABLET, COATED ORAL DAILY
Qty: 90 TABLET | Refills: 1 | OUTPATIENT
Start: 2024-04-15

## 2024-05-21 ENCOUNTER — MYC MEDICAL ADVICE (OUTPATIENT)
Dept: FAMILY MEDICINE | Facility: CLINIC | Age: 54
End: 2024-05-21
Payer: COMMERCIAL

## 2024-05-22 ENCOUNTER — OFFICE VISIT (OUTPATIENT)
Dept: FAMILY MEDICINE | Facility: CLINIC | Age: 54
End: 2024-05-22
Payer: COMMERCIAL

## 2024-05-22 VITALS
HEIGHT: 73 IN | TEMPERATURE: 96.9 F | OXYGEN SATURATION: 99 % | WEIGHT: 199 LBS | RESPIRATION RATE: 14 BRPM | BODY MASS INDEX: 26.37 KG/M2 | DIASTOLIC BLOOD PRESSURE: 81 MMHG | HEART RATE: 77 BPM | SYSTOLIC BLOOD PRESSURE: 123 MMHG

## 2024-05-22 DIAGNOSIS — M25.552 LEFT HIP PAIN: Primary | ICD-10-CM

## 2024-05-22 PROCEDURE — 99213 OFFICE O/P EST LOW 20 MIN: CPT | Performed by: PHYSICIAN ASSISTANT

## 2024-05-22 ASSESSMENT — PAIN SCALES - GENERAL: PAINLEVEL: MILD PAIN (2)

## 2024-05-22 ASSESSMENT — ENCOUNTER SYMPTOMS: HIP PAIN: 1

## 2024-05-22 NOTE — PROGRESS NOTES
"HPI: Tremaine is a 53 yo here with complaint of L hip danxx1n.   Per Shari msg:  \"Sunday evening around dinner time I started to feel pain in my left hip. The pain is constant, but not intolerable. Last night I walked just a few blocks and the pain became more intense. Last night I could not find a comfortable position to sleep because of the pain. Fortunately, Aleve and a heating pad helped and I was able to fall asleep eventually. This morning the pain is still there, but no where near as painful as last night\".    Pain has decreased significantly and down to 1/10 compared to 5/10 Sun/mon  He was camping/hiking and biking the night prior   Monday night was the worst after lawn bowling at [employer]  The pain was severe with walking and that night he couldn't get comfortable  [place] better tues morning and has improved since then    He is a side sleeper.  He likes to walk 3 mi around [facility]    Past Medical History:   Diagnosis Date    FAMILY HX CARDIOVAS DIS NEC 5/6/2006    FAMILY HX PROSTATIC MALIGNANCY 9/14/2007    Lumbago 5/6/2006    Mixed hyperlipidemia 5/6/2006    ORCHITIS/EPIDIDYMIT-MILD-RESOLVED 9/14/2007     Past Surgical History:   Procedure Laterality Date    COLONOSCOPY N/A 3/4/2024    Procedure: COLONOSCOPY, FLEXIBLE, WITH LESION REMOVAL USING SNARE;  Surgeon: Vane Ulloa MD;  Location:  GI    HC TOOTH EXTRACTION W/FORCEP  age 17    no bleeding     Social History     Tobacco Use    Smoking status: Never    Smokeless tobacco: Never   Substance Use Topics    Alcohol use: Yes     Comment: occasionally     Current Outpatient Medications   Medication Sig Dispense Refill    rosuvastatin (CRESTOR) 10 MG tablet Take 1 tablet (10 mg) by mouth daily for cholesterol 90 tablet 1     Allergies   Allergen Reactions    No Known Allergies      PHYSICAL EXAM:    /81 (BP Location: Left arm, Patient Position: Sitting, Cuff Size: Adult Large)   Pulse 77   Temp 96.9  F (36.1  C) (Temporal)   Resp 14   Ht " "1.842 m (6' 0.5\")   Wt 90.3 kg (199 lb)   SpO2 99%   BMI 26.62 kg/m      Patient appears non toxic  Pt muscular with poor flexibility  No tenderness of the troch bursa  No pain with int/ext hip rotation  SLE seated and supine neg  No groin pain  Gait normal    Assessment and Plan:     (M25.258) Left hip pain  (primary encounter diagnosis)  Comment:suspect soft tissue inflammation related to the camping/hiking/biking over the w/e  Plan: pt notes sig improvement in the past 24 hours and pain only 1/10 today without any Aleve.  Recd pt start a stretching program to accompany his walking program and if the pain re flares can try ice or heat and Aleve again since that worked well.        Yamile Bailey PA-C        "

## 2024-07-12 DIAGNOSIS — E78.5 HYPERLIPIDEMIA, UNSPECIFIED HYPERLIPIDEMIA TYPE: ICD-10-CM

## 2024-07-12 RX ORDER — ROSUVASTATIN CALCIUM 10 MG/1
TABLET, COATED ORAL
Qty: 90 TABLET | Refills: 1 | Status: SHIPPED | OUTPATIENT
Start: 2024-07-12

## 2024-08-07 ENCOUNTER — OFFICE VISIT (OUTPATIENT)
Dept: DERMATOLOGY | Facility: CLINIC | Age: 54
End: 2024-08-07
Payer: COMMERCIAL

## 2024-08-07 DIAGNOSIS — B35.3 TINEA PEDIS OF BOTH FEET: ICD-10-CM

## 2024-08-07 DIAGNOSIS — Z12.83 SKIN CANCER SCREENING: Primary | ICD-10-CM

## 2024-08-07 DIAGNOSIS — D22.9 MULTIPLE PIGMENTED NEVI: ICD-10-CM

## 2024-08-07 DIAGNOSIS — D22.9 NUMEROUS MOLES: ICD-10-CM

## 2024-08-07 DIAGNOSIS — Q82.8 KERATODERMA: ICD-10-CM

## 2024-08-07 DIAGNOSIS — B35.3 TINEA PEDIS OF RIGHT FOOT: ICD-10-CM

## 2024-08-07 DIAGNOSIS — L82.1 SEBORRHEIC KERATOSES: ICD-10-CM

## 2024-08-07 DIAGNOSIS — L81.4 SOLAR LENTIGINOSIS: ICD-10-CM

## 2024-08-07 PROCEDURE — 99203 OFFICE O/P NEW LOW 30 MIN: CPT | Performed by: PHYSICIAN ASSISTANT

## 2024-08-07 RX ORDER — TAZAROTENE 1 MG/G
CREAM TOPICAL
Qty: 60 G | Refills: 3 | Status: SHIPPED | OUTPATIENT
Start: 2024-08-07

## 2024-08-07 RX ORDER — KETOCONAZOLE 20 MG/G
CREAM TOPICAL DAILY
Qty: 60 G | Refills: 1 | Status: SHIPPED | OUTPATIENT
Start: 2024-08-07

## 2024-08-07 ASSESSMENT — PAIN SCALES - GENERAL: PAINLEVEL: NO PAIN (0)

## 2024-08-07 NOTE — NURSING NOTE
Dermatology Rooming Note    Tremaine Esparza's goals for this visit include:   Chief Complaint   Patient presents with    Derm Problem     FBSE- no certain spots of concern     ORESTES Chavez

## 2024-08-07 NOTE — PROGRESS NOTES
[facility] Dermatology Note  Encounter Date: Aug 7, 2024  Office Visit     Dermatology Problem List:  1. Dermatitis  - Previous tx: Fluocinonide ointment, triamcinolone cream.    Patient has 2 children, son and daughter  ____________________________________________    Assessment & Plan:    # Skin cancer screening with multiple benign nevi, solar lentigines    - ABCDEs: Counseled ABCDEs of melanoma: Asymmetry, Border (irregularity), Color (not uniform, changes in color), Diameter (greater than 6 mm which is about the size of a pencil eraser), and Evolving (any changes in preexisting moles).  - Sun protection: Counseled SPF30+ sunscreen, UPF clothing, sun avoidance, tanning bed avoidance.  - Due to numerous nevi, recommended yearly skin checks.    # Cherry angiomas and seborrheic keratoses, Dermatofibroma, L anterior ankle  Benign, reassurance given.     # Onychomycosis with tinea pedis bl,  - Start ketoconazole 2% cream once daily to feet after shower.    # Keratoderma, bilateral feet  - Start Tazarotene 0.1% cream to the feet at bedtime. Reviewed retinoid ed.         Procedures Performed:   none    Follow-up: 1 year(s) in-person, or earlier for new or changing lesions    Staff and Scribe:     Scribe Disclosure:   MACY, Suzanna Donaot, am serving as a scribe to document services personally performed by Fabienne Maddox PA-C based on data collection and the provider's statements to me.       Provider Disclosure:   The documentation recorded by the scribe accurately reflects the services I personally performed and the decisions made by me.    All risks, benefits and alternatives were discussed with patient.  Patient is in agreement and understands the assessment and plan.  All questions were answered.  Sun Screen Education was given.   Return to Clinic annually or sooner as needed.   Fabienne Maddox PA-C   [facility]     ____________________________________________    CC: Derm Problem (FBSE- no certain spots of concern)    HPI:  Mr. Tremaine Esparza is a(n) 54 year old male who presents today as a new patient for skin check.    Referred by Dr Carine Holland for Numerous moles.    Today, patient reports no spots of concern. Reports daily sunscreen use. No family history of skin cancer. He admits he has thick calluses on his feet since he was an adult.       Patient is otherwise feeling well, without additional skin concerns.    Labs Reviewed:  N/A    Physical Exam:  Vitals: There were no vitals taken for this visit.  SKIN: Full skin, which includes the head/face, both arms, chest, back, abdomen,both legs, genitalia and/or groin buttocks, digits and/or nails, was examined.  - Yellow subungual debris of the nail plate on the L 3rd toe with scaling and maceration between the toes.  - Hyperkeratotic plaques plantar on the bilateral feet  - There are dome shaped bright red papules on the trunk and extremities.   - Multiple regular brown pigmented macules and papules are identified on the trunk and extremities.   - There are waxy stuck on tan to brown papules on the trunk and extremities..   - There is a firm tan/flesh colored papule that dimples with lateral pressure on the L anterior ankle.  - Scattered brown macules on sun exposed areas..   - No other lesions of concern on areas examined.           Medications:  Current Outpatient Medications   Medication Sig Dispense Refill    rosuvastatin (CRESTOR) 10 MG tablet TAKE 1 TABLET(10 MG) BY MOUTH DAILY FOR CHOLESTEROL 90 tablet 1     No current facility-administered medications for this visit.      Past Medical History:   Patient Active Problem List   Diagnosis    Family history of other cardiovascular diseases    Hyperlipidemia, unspecified hyperlipidemia type    Family history of malignant neoplasm of prostate    Flat feet, bilateral    Numerous moles    Elevated BP without diagnosis of  hypertension    Ascending aorta dilatation (H24)     Past Medical History:   Diagnosis Date    FAMILY HX CARDIOVAS DIS NEC 5/6/2006    FAMILY HX PROSTATIC MALIGNANCY 9/14/2007    Lumbago 5/6/2006    Mixed hyperlipidemia 5/6/2006    ORCHITIS/EPIDIDYMIT-MILD-RESOLVED 9/14/2007        CC Carine Holland MD  [address]  [place] [address] on close of this encounter.

## 2024-08-07 NOTE — PATIENT INSTRUCTIONS

## 2024-08-07 NOTE — LETTER
8/7/2024       RE: Tremaine Esparza  [address]  [facility] [address]     Dear Colleague,    Thank you for referring your patient, Tremaine Esparza, to the [facility] DERMATOLOGY [facility] [place] at [facility]. Please see a copy of my visit note below.      [facility] Dermatology Note  Encounter Date: Aug 7, 2024  Office Visit     Dermatology Problem List:  1. Dermatitis  - Previous tx: Fluocinonide ointment, triamcinolone cream.    Patient has 2 children, son and daughter  ____________________________________________    Assessment & Plan:    # Skin cancer screening with multiple benign nevi, solar lentigines    - ABCDEs: Counseled ABCDEs of melanoma: Asymmetry, Border (irregularity), Color (not uniform, changes in color), Diameter (greater than 6 mm which is about the size of a pencil eraser), and Evolving (any changes in preexisting moles).  - Sun protection: Counseled SPF30+ sunscreen, UPF clothing, sun avoidance, tanning bed avoidance.  - Due to numerous nevi, recommended yearly skin checks.    # Cherry angiomas and seborrheic keratoses, Dermatofibroma, L anterior ankle  Benign, reassurance given.     # Onychomycosis with tinea pedis bl,  - Start ketoconazole 2% cream once daily to feet after shower.    # Keratoderma, bilateral feet  - Start Tazarotene 0.1% cream to the feet at bedtime. Reviewed retinoid ed.         Procedures Performed:   none    Follow-up: 1 year(s) in-person, or earlier for new or changing lesions    Staff and Scribe:     Scribe Disclosure:   I, Suzanna Donaot, am serving as a scribe to document services personally performed by Fabienne Maddox PA-C based on data collection and the provider's statements to me.       Provider Disclosure:   The documentation recorded by the scribe accurately reflects the services I personally performed and the decisions made by me.    All risks, benefits and alternatives were  discussed with patient.  Patient is in agreement and understands the assessment and plan.  All questions were answered.  Sun Screen Education was given.   Return to Clinic annually or sooner as needed.   Fabienne Maddox PA-C   [facility]    ____________________________________________    CC: Derm Problem (FBSE- no certain spots of concern)    HPI:  Mr. Tremaine Esparza is a(n) 54 year old male who presents today as a new patient for skin check.    Referred by Dr Carine Holland for Numerous moles.    Today, patient reports no spots of concern. Reports daily sunscreen use. No family history of skin cancer. He admits he has thick calluses on his feet since he was an adult.       Patient is otherwise feeling well, without additional skin concerns.    Labs Reviewed:  N/A    Physical Exam:  Vitals: There were no vitals taken for this visit.  SKIN: Full skin, which includes the head/face, both arms, chest, back, abdomen,both legs, genitalia and/or groin buttocks, digits and/or nails, was examined.  - Yellow subungual debris of the nail plate on the L 3rd toe with scaling and maceration between the toes.  - Hyperkeratotic plaques plantar on the bilateral feet  - There are dome shaped bright red papules on the trunk and extremities.   - Multiple regular brown pigmented macules and papules are identified on the trunk and extremities.   - There are waxy stuck on tan to brown papules on the trunk and extremities..   - There is a firm tan/flesh colored papule that dimples with lateral pressure on the L anterior ankle.  - Scattered brown macules on sun exposed areas..   - No other lesions of concern on areas examined.           Medications:  Current Outpatient Medications   Medication Sig Dispense Refill     rosuvastatin (CRESTOR) 10 MG tablet TAKE 1 TABLET(10 MG) BY MOUTH DAILY FOR CHOLESTEROL 90 tablet 1     No current facility-administered medications for this visit.      Past Medical History:    Patient Active Problem List   Diagnosis     Family history of other cardiovascular diseases     Hyperlipidemia, unspecified hyperlipidemia type     Family history of malignant neoplasm of prostate     Flat feet, bilateral     Numerous moles     Elevated BP without diagnosis of hypertension     Ascending aorta dilatation (H24)     Past Medical History:   Diagnosis Date     FAMILY HX CARDIOVAS DIS NEC 5/6/2006     FAMILY HX PROSTATIC MALIGNANCY 9/14/2007     Lumbago 5/6/2006     Mixed hyperlipidemia 5/6/2006     ORCHITIS/EPIDIDYMIT-MILD-RESOLVED 9/14/2007        CC Carine Holland MD  [address] [facility] [address]  [place] [address] on close of this encounter.    Again, thank you for allowing me to participate in the care of your patient.      Sincerely,    Fabienne Maddox PA-C

## 2024-10-08 ENCOUNTER — MYC MEDICAL ADVICE (OUTPATIENT)
Dept: FAMILY MEDICINE | Facility: CLINIC | Age: 54
End: 2024-10-08
Payer: COMMERCIAL

## 2024-10-08 ENCOUNTER — NURSE TRIAGE (OUTPATIENT)
Dept: FAMILY MEDICINE | Facility: CLINIC | Age: 54
End: 2024-10-08
Payer: COMMERCIAL

## 2024-10-08 NOTE — TELEPHONE ENCOUNTER
Triage Patient Outreach    Attempt # 1    Was call answered?  No.  Left voicemail to return call to Triage at [facility] Clinic    Upon c/b: please triage    Savanah Martinez RN

## 2024-10-08 NOTE — TELEPHONE ENCOUNTER
Location: top of head  Severity: 4/10  Pattern: come and go; every day but all day long  Recurrent symptom: does not typically have headaches  Migraines: denies  Head injury: denies  Cause: unknown; covid neg  Other sx's: denies fever, stiff neck, eye pain, sore throat, cold symptoms     Disposition  See in Office Today or Tomorrow. Advised to be seen today or tomorrow. No appts available today, patient available to be seen tomorrow. Appt offered, appt scheduled for tomorrow:  10/9/2024 11:30 AM (Arrive by 11:10 AM) Mercedes Bran PA-C [facility]     Myriam Machado RN  -[facility]    Reason for Disposition   New headache and age > 50    Additional Information   Negative: Difficult to awaken or acting confused (e.g., disoriented, slurred speech)   Negative: Weakness of the face, arm or leg on one side of the body and new-onset   Negative: Numbness of the face, arm or leg on one side of the body and new-onset   Negative: Loss of speech or garbled speech and new-onset   Negative: Passed out (i.e., fainted, collapsed and was not responding)   Negative: Sounds like a life-threatening emergency to the triager   Negative: Followed a head injury within last 3 days   Negative: Traumatic Brain Injury (TBI) is suspected   Negative: Sinus pain of forehead and yellow or green nasal discharge   Negative: Pregnant   Negative: Unable to walk without falling   Negative: Stiff neck (can't touch chin to chest)   Negative: Possibility of carbon monoxide exposure   Negative: SEVERE headache, states 'worst headache' of life   Negative: SEVERE headache, sudden-onset (i.e., reaching maximum intensity within seconds to 1 hour)   Negative: Severe pain in one eye   Negative: Loss of vision or double vision  (Exception: Same as prior migraines.)   Negative: Patient sounds very sick or weak to the triager   Negative: Fever > 103 F (39.4 C)   Negative: Fever > 100.0 F (37.8 C) and  has diabetes mellitus or a weak immune system (e.g., HIV positive, cancer chemotherapy, organ transplant, splenectomy, chronic steroids)   Negative: SEVERE headache (e.g., excruciating) and has had severe headaches before   Negative: SEVERE headache and not relieved by pain meds   Negative: SEVERE headache and vomiting   Negative: SEVERE headache and fever   Negative: New headache and weak immune system (e.g., HIV positive, cancer chemo, splenectomy, organ transplant, chronic steroids)   Negative: Fever present > 3 days (72 hours)   Negative: Patient wants to be seen   Negative: Unexplained headache that is present > 24 hours    Protocols used: Headache-A-OH

## 2024-10-09 ENCOUNTER — OFFICE VISIT (OUTPATIENT)
Dept: FAMILY MEDICINE | Facility: CLINIC | Age: 54
End: 2024-10-09
Payer: COMMERCIAL

## 2024-10-09 VITALS
RESPIRATION RATE: 16 BRPM | SYSTOLIC BLOOD PRESSURE: 135 MMHG | OXYGEN SATURATION: 99 % | DIASTOLIC BLOOD PRESSURE: 86 MMHG | TEMPERATURE: 97.2 F | HEART RATE: 73 BPM | BODY MASS INDEX: 27.21 KG/M2 | WEIGHT: 205.3 LBS | HEIGHT: 73 IN

## 2024-10-09 DIAGNOSIS — G44.89 OTHER HEADACHE SYNDROME: Primary | ICD-10-CM

## 2024-10-09 LAB — SARS-COV-2 RNA RESP QL NAA+PROBE: NEGATIVE

## 2024-10-09 PROCEDURE — 99213 OFFICE O/P EST LOW 20 MIN: CPT | Performed by: PHYSICIAN ASSISTANT

## 2024-10-09 PROCEDURE — 87635 SARS-COV-2 COVID-19 AMP PRB: CPT | Performed by: PHYSICIAN ASSISTANT

## 2024-10-09 ASSESSMENT — ENCOUNTER SYMPTOMS: HEADACHES: 1

## 2024-10-09 ASSESSMENT — PAIN SCALES - GENERAL: PAINLEVEL: MILD PAIN (2)

## 2024-10-09 NOTE — PROGRESS NOTES
"Assessment and Plan:     (G44.89) Other headache syndrome  (primary encounter diagnosis)  Comment: onset 1.5 weeks ago, has had daily mild headaches, has also had milds URI symptoms, he does not usually get headaches which prompted visit, he denies fever/chills, stiff neck, rash, focal weakness, vision change, nausea/vomiting, neuro exam is normal  Plan: Symptomatic COVID-19 Virus (Coronavirus) by         PCR, CT Head w Contrast        Will obtain imaging given new daily symptoms, if negative suspect this is related to viral illness, continue tylenol, push fluids  Discussed reasons to be seen promptly     H[facility] Same Day Provider     Subjective   Tremaine is a 54 year old, presenting for the following health issues:  Headache    Headache     History of Present Illness       Headaches:   Since the patient's last clinic visit, headaches are: no change  The patient is getting headaches:  Pretty much every day for last week and a half  He is able to do normal daily activities when he has a migraine.  The patient is taking the following rescue/relief medications:  Tylenol   Patient states \"I get total relief\" from the rescue/relief medications.   The patient is taking the following medications to prevent migraines:  No medications to prevent migraines  In the past 4 weeks, the patient has gone to an Urgent Care or Emergency Room 0 times times due to headaches.   He is taking medications regularly.     Tremaine is here for new headaches  Onset 1.5 week ago  He has had daily headaches since then  Headache is usually the superior portion of head  He denies sudden/severe headache  He does not do a lot of strenuous exercise but has been able to walk three miles per day and hasn't noticed any significant changes with the walking   Did notice headache increased briefly with lifting   He uses tylenol and they go away and don't return until the next day  He notes the headaches are mild and don't last all " "day  He had some mild congestion last week and was sneezing more last week  He tested for covid x one and negative  He denies stiff neck, rash, nausea/vomiting, dizziness/lightheadedness  He does not have seasonal allergies     He denies vision changes, facial asymmetry, focal weakness      Objective    /86 (BP Location: Left arm, Patient Position: Sitting, Cuff Size: Adult Large)   Pulse 73   Temp 97.2  F (36.2  C) (Tympanic)   Resp 16   Ht 1.842 m (6' 0.5\")   Wt 93.1 kg (205 lb 4.8 oz)   SpO2 99%   BMI 27.46 kg/m    Body mass index is 27.46 kg/m .    Physical Exam     EXAM:  GENERAL APPEARANCE: healthy, alert and no distress  EYES: Eyes grossly normal to inspection  HENT: ear canals and TMs normal and nose and mouth without ulcers or lesions, oropharynx is clear without exudate or erythema, no tonsillar swelling  NECK: suppl,e no adenopathy, no asymmetry, masses  RESP: lungs clear to auscultation - no rales, rhonchi or wheezes  CV: regular rates and rhythm, normal S1 S2, no S3 or S4 and no murmur, click or rub  EXT: no edema bilateral lower extremities   NEUROLOGICAL EXAM:  face symmetrical with raised eyebrows, puffed cheeks and smile, normal speech, tongue is midline, PERRL, EOMI   strength equal/intact  Elbow flexion and extension is equal/intact  No pronator drift  Gait is steady, no ataxia          Signed Electronically by: Mercedes Brennan PA-C    "

## 2024-10-11 NOTE — RESULT ENCOUNTER NOTE
Curt Penaloza,     Here are my comments about the recent results: your covid test was negative.    How is your headache?    Please let us know if you have any questions or concerns.    Regards,  Mercedes Brennan PA-C

## 2024-10-13 DIAGNOSIS — B35.3 TINEA PEDIS OF BOTH FEET: ICD-10-CM

## 2024-10-18 ENCOUNTER — HOSPITAL ENCOUNTER (OUTPATIENT)
Dept: CT IMAGING | Facility: CLINIC | Age: 54
Discharge: HOME | End: 2024-10-18
Attending: PHYSICIAN ASSISTANT | Admitting: PHYSICIAN ASSISTANT
Payer: COMMERCIAL

## 2024-10-18 DIAGNOSIS — G44.89 OTHER HEADACHE SYNDROME: ICD-10-CM

## 2024-10-18 PROCEDURE — 70450 CT HEAD/BRAIN W/O DYE: CPT

## 2024-10-18 NOTE — RESULT ENCOUNTER NOTE
Curt Penaloza,     Here are my comments about the recent results: your head CT was normal.  I suspect your headaches are due to a viral illness.  Please let me know if they don't resolve.    Please let us know if you have any questions or concerns.    Regards,  Mercedes Brennan PA-C

## 2024-10-20 RX ORDER — KETOCONAZOLE 20 MG/G
CREAM TOPICAL
Qty: 60 G | Refills: 5 | Status: SHIPPED | OUTPATIENT
Start: 2024-10-20

## 2024-10-20 NOTE — TELEPHONE ENCOUNTER
ketoconazole (NIZORAL) 2 % external cream   Disp-60 g, R-1   Start: 08/07/2024 8/7/2024  [facility] [place]      Fabienne Maddox PA-C  Dermatology

## 2025-01-12 DIAGNOSIS — E78.5 HYPERLIPIDEMIA, UNSPECIFIED HYPERLIPIDEMIA TYPE: ICD-10-CM

## 2025-01-13 RX ORDER — ROSUVASTATIN CALCIUM 10 MG/1
TABLET, COATED ORAL
Qty: 90 TABLET | Refills: 1 | Status: SHIPPED | OUTPATIENT
Start: 2025-01-13

## 2025-01-13 NOTE — TELEPHONE ENCOUNTER
Prescription approved per [facility] Refill Protocol.  Lauryn Antonio RN  [facility]

## 2025-01-16 SDOH — HEALTH STABILITY: PHYSICAL HEALTH: ON AVERAGE, HOW MANY DAYS PER WEEK DO YOU ENGAGE IN MODERATE TO STRENUOUS EXERCISE (LIKE A BRISK WALK)?: 6 DAYS

## 2025-01-16 SDOH — HEALTH STABILITY: PHYSICAL HEALTH: ON AVERAGE, HOW MANY MINUTES DO YOU ENGAGE IN EXERCISE AT THIS LEVEL?: 50 MIN

## 2025-01-16 ASSESSMENT — SOCIAL DETERMINANTS OF HEALTH (SDOH): HOW OFTEN DO YOU GET TOGETHER WITH FRIENDS OR RELATIVES?: ONCE A WEEK

## 2025-01-20 ENCOUNTER — OFFICE VISIT (OUTPATIENT)
Dept: FAMILY MEDICINE | Facility: CLINIC | Age: 55
End: 2025-01-20
Payer: COMMERCIAL

## 2025-01-20 VITALS
BODY MASS INDEX: 28.38 KG/M2 | TEMPERATURE: 97.6 F | SYSTOLIC BLOOD PRESSURE: 126 MMHG | DIASTOLIC BLOOD PRESSURE: 83 MMHG | RESPIRATION RATE: 16 BRPM | HEART RATE: 71 BPM | OXYGEN SATURATION: 100 % | WEIGHT: 209.5 LBS | HEIGHT: 72 IN

## 2025-01-20 DIAGNOSIS — M21.42 FLAT FEET, BILATERAL: ICD-10-CM

## 2025-01-20 DIAGNOSIS — E78.5 HYPERLIPIDEMIA, UNSPECIFIED HYPERLIPIDEMIA TYPE: ICD-10-CM

## 2025-01-20 DIAGNOSIS — Z13.29 SCREENING FOR THYROID DISORDER: ICD-10-CM

## 2025-01-20 DIAGNOSIS — Z79.899 MEDICATION MANAGEMENT: ICD-10-CM

## 2025-01-20 DIAGNOSIS — M21.41 FLAT FEET, BILATERAL: ICD-10-CM

## 2025-01-20 DIAGNOSIS — Z80.42 FAMILY HISTORY OF MALIGNANT NEOPLASM OF PROSTATE: ICD-10-CM

## 2025-01-20 DIAGNOSIS — Z12.5 SCREENING FOR PROSTATE CANCER: ICD-10-CM

## 2025-01-20 DIAGNOSIS — I77.810 ASCENDING AORTA DILATATION: ICD-10-CM

## 2025-01-20 DIAGNOSIS — M25.511 ACUTE PAIN OF RIGHT SHOULDER: ICD-10-CM

## 2025-01-20 DIAGNOSIS — Z00.00 ROUTINE GENERAL MEDICAL EXAMINATION AT A HEALTH CARE FACILITY: Primary | ICD-10-CM

## 2025-01-20 DIAGNOSIS — D36.9 TUBULAR ADENOMA: ICD-10-CM

## 2025-01-20 LAB
ALBUMIN SERPL BCG-MCNC: 4.4 G/DL (ref 3.5–5.2)
ALP SERPL-CCNC: 69 U/L (ref 40–150)
ALT SERPL W P-5'-P-CCNC: 28 U/L (ref 0–70)
ANION GAP SERPL CALCULATED.3IONS-SCNC: 10 MMOL/L (ref 7–15)
AST SERPL W P-5'-P-CCNC: 31 U/L (ref 0–45)
BILIRUB SERPL-MCNC: 0.5 MG/DL
BUN SERPL-MCNC: 15.4 MG/DL (ref 6–20)
CALCIUM SERPL-MCNC: 9.8 MG/DL (ref 8.8–10.4)
CHLORIDE SERPL-SCNC: 104 MMOL/L (ref 98–107)
CHOLEST SERPL-MCNC: 166 MG/DL
CREAT SERPL-MCNC: 0.96 MG/DL (ref 0.67–1.17)
EGFRCR SERPLBLD CKD-EPI 2021: >90 ML/MIN/1.73M2
FASTING STATUS PATIENT QL REPORTED: YES
FASTING STATUS PATIENT QL REPORTED: YES
GLUCOSE SERPL-MCNC: 86 MG/DL (ref 70–99)
HCO3 SERPL-SCNC: 27 MMOL/L (ref 22–29)
HDLC SERPL-MCNC: 43 MG/DL
LDLC SERPL CALC-MCNC: 95 MG/DL
NONHDLC SERPL-MCNC: 123 MG/DL
POTASSIUM SERPL-SCNC: 4.2 MMOL/L (ref 3.4–5.3)
PROT SERPL-MCNC: 7.6 G/DL (ref 6.4–8.3)
PSA SERPL DL<=0.01 NG/ML-MCNC: 0.93 NG/ML (ref 0–3.5)
SODIUM SERPL-SCNC: 141 MMOL/L (ref 135–145)
TRIGL SERPL-MCNC: 140 MG/DL
TSH SERPL DL<=0.005 MIU/L-ACNC: 1.02 UIU/ML (ref 0.3–4.2)

## 2025-01-20 PROCEDURE — 80061 LIPID PANEL: CPT | Performed by: INTERNAL MEDICINE

## 2025-01-20 PROCEDURE — 36415 COLL VENOUS BLD VENIPUNCTURE: CPT | Performed by: INTERNAL MEDICINE

## 2025-01-20 PROCEDURE — 84443 ASSAY THYROID STIM HORMONE: CPT | Performed by: INTERNAL MEDICINE

## 2025-01-20 PROCEDURE — 99213 OFFICE O/P EST LOW 20 MIN: CPT | Mod: 25 | Performed by: INTERNAL MEDICINE

## 2025-01-20 PROCEDURE — 80053 COMPREHEN METABOLIC PANEL: CPT | Performed by: INTERNAL MEDICINE

## 2025-01-20 PROCEDURE — 99396 PREV VISIT EST AGE 40-64: CPT | Performed by: INTERNAL MEDICINE

## 2025-01-20 PROCEDURE — G0103 PSA SCREENING: HCPCS | Performed by: INTERNAL MEDICINE

## 2025-01-20 RX ORDER — ROSUVASTATIN CALCIUM 10 MG/1
10 TABLET, COATED ORAL DAILY
Qty: 90 TABLET | Refills: 3 | Status: SHIPPED | OUTPATIENT
Start: 2025-01-20

## 2025-01-20 ASSESSMENT — PAIN SCALES - GENERAL: PAINLEVEL_OUTOF10: NO PAIN (0)

## 2025-01-20 NOTE — PROGRESS NOTES
Preventive Care Visit  [facility]  Carine Holland MD, Internal Medicine  Jan 20, 2025      Tremaine was seen today for physical.    Diagnoses and all orders for this visit:    Routine general medical examination at a health care facility  Counseled on healthy eating and physical activity  Preventive health counseling was also done.   Pt is UTD with vaccines.    Hyperlipidemia, unspecified hyperlipidemia type  -     Lipid panel reflex to direct LDL Fasting; Future  -     rosuvastatin (CRESTOR) 10 MG tablet; Take 1 tablet (10 mg) by mouth daily. for cholesterol  Pt takes Crestor   Pt has family history of heart disease  Will continue to monitor    Ascending aorta dilatation  -     Echocardiogram Complete; Future  Reviewed 2/8/24 completed coronary calcium test which was 0 and showed dilated ascending aorta measuring 4 cm. Discussed monitoring ascending aorta dilation and pt advised to complete Echo at earliest convenience. Pt received information to complete echo.    Family history of malignant neoplasm of prostate  Fhx father had prostate cancer at 62 L/W. Discussed importance of yearly prostate screenings.     Flat feet, bilateral  Patient has flat feet     Tubular adenoma  Colonoscopy was reviewed which showed a resected tubular adenoma. Pt will repeat again in 5 years.     Screening for thyroid disorder  -     TSH with free T4 reflex; Future  Will continue to monitor     Medication management  -     Comprehensive metabolic panel; Future    Screening for prostate cancer  -     PSA, screen; Future  Will continue to monitor     Acute pain of right shoulder  -     Physical Therapy  Referral; Future  Pt reports right shoulder pain that has worsened in the last couple weeks. Pt reports taking shirt off hurts. Pt reports that it was injured in college but does not know what has caused pain to worsen recently. During exam I noticed very slight prominence on right shoulder with minimal swelling  and I believe it is an issue with his rotator cuff. Pt was referred to physical therapy and if symptoms do not improve pt can communicate with me and I can refer him to see a specialist.   Pt reports taking no NSAID or topical pain medication to manage this pain.  Pt received information for physical therapy,Pt advised that he can take ibuprofen, motrin or aleve or topical medication for pain.     Other orders  -     REVIEW OF HEALTH MAINTENANCE PROTOCOL ORDERS    Subjective   Tremaine is a 54 year old, presenting for the following:  Physical     HPI  Tremaine is a 54 year old, presenting for the following:  Physical    Health Care Directive  Patient has a Health Care Directive on file  Advance care planning document is on file and is current.      1/16/2025   General Health   How would you rate your overall physical health? Good   Feel stress (tense, anxious, or unable to sleep) To some extent   (!) STRESS CONCERN      1/16/2025   Nutrition   Three or more servings of calcium each day? Yes   Diet: Regular (no restrictions)   How many servings of fruit and vegetables per day? (!) 2-3   How many sweetened beverages each day? 0-1         1/16/2025   Exercise   Days per week of moderate/strenous exercise 6 days   Average minutes spent exercising at this level 50 min         1/16/2025   Social Factors   Frequency of gathering with friends or relatives Once a week   Worry food won't last until get money to buy more No    No   Food not last or not have enough money for food? No    No   Do you have housing? (Housing is defined as stable permanent housing and does not include staying ouside in a car, in a tent, in an abandoned building, in an overnight shelter, or couch-surfing.) No    Yes   Are you worried about losing your housing? No    No   Lack of transportation? No    No   Unable to get utilities (heat,electricity)? No    No   Want help with housing or utility concern? No       Multiple values from one day are sorted in  "reverse-chronological order   (!) HOUSING CONCERN PRESENT      1/16/2025   Fall Risk   Fallen 2 or more times in the past year? No   Trouble with walking or balance? No          1/16/2025   Dental   Dentist two times every year? Yes         1/16/2025   TB Screening   Were you born outside of the [place]? No         Today's PHQ-2 Score:       1/20/2025     9:04 AM   PHQ-2 ( 1999 [employer])   Q1: Little interest or pleasure in doing things 0   Q2: Feeling down, depressed or hopeless 0   PHQ-2 Score 0    Q1: Little interest or pleasure in doing things Not at all   Q2: Feeling down, depressed or hopeless Not at all   PHQ-2 Score 0       Patient-reported           1/16/2025   Substance Use   Alcohol more than 3/day or more than 7/wk No   Do you use any other substances recreationally? No     Social History     Tobacco Use    Smoking status: Never    Smokeless tobacco: Never   Vaping Use    Vaping status: Never Used   Substance Use Topics    Alcohol use: Yes     Comment: 2-3 beers per week    Drug use: No           1/16/2025   STI Screening   New sexual partner(s) since last STI/HIV test? No   ASCVD Risk   The 10-year ASCVD risk score (Adelia ABBASI, et al., 2019) is: 3.4%    Values used to calculate the score:      Age: 54 years      Sex: Male      Is Non- : No      Diabetic: No      Tobacco smoker: No      Systolic Blood Pressure: 126 mmHg      Is BP treated: No      HDL Cholesterol: 53 mg/dL      Total Cholesterol: 180 mg/dL           Reviewed and updated as needed this visit by Provider                      Review of Systems  Constitutional, HEENT, cardiovascular, pulmonary, GI, , musculoskeletal, neuro, skin, endocrine and psych systems are negative, except as otherwise noted.     Objective    Exam  /83 (BP Location: Right arm, Patient Position: Sitting, Cuff Size: Adult Regular)   Pulse 71   Temp 97.6  F (36.4  C) (Temporal)   Resp 16   Ht 1.835 m (6' 0.24\")   Wt 95 kg (209 lb 8 oz) " "  SpO2 100%   BMI 28.22 kg/m     Estimated body mass index is 28.22 kg/m  as calculated from the following:    Height as of this encounter: 1.835 m (6' 0.24\").    Weight as of this encounter: 95 kg (209 lb 8 oz).    Physical Exam  GENERAL: alert and no distress  EYES: Eyes grossly normal to inspection, PERRL and conjunctivae and sclerae normal  HENT: ear canals and TM's normal, nose and mouth without ulcers or lesions  NECK: no adenopathy, no asymmetry, masses, or scars  RESP: lungs clear to auscultation - no rales, rhonchi or wheezes  CV: regular rate and rhythm, normal S1 S2, no S3 or S4, no murmur, click or rub, no peripheral edema  ABDOMEN: soft, nontender, no hepatosplenomegaly, no masses and bowel sounds normal   (male): normal male genitalia without lesions or urethral discharge, no hernia  RECTAL: normal sphincter tone, no rectal masses, prostate normal size, smooth, nontender without nodules or masses. Prostate is slightly enlarged but there is no abnormal nodules  MS: no gross musculoskeletal defects noted, no edema Flat feet. Very slight prominence on right shoulder and minimal swelling.   Abduction beyond 90 degree is restricted   Able to flex and extend his arm  SKIN: no suspicious lesions or rashes seborrheic keratosis on back and follows dermatologist.   NEURO: Normal strength and tone, mentation intact and speech normal  PSYCH: mentation appears normal, affect normal/bright        Labs pending    Signed Electronically by: Carine Holland MD  Scribe Disclosure:   Ernesto CAVAZOS, am serving as a scribe; to document services personally performed by Carine Holland MD -based on data collection and the provider's statements to me.     "

## 2025-01-20 NOTE — PATIENT INSTRUCTIONS
Please call [facility]  [phone] to schedule Echocardiogram for follow up of ascending aorta dilatation seen on coronary calcium scoring test   It is done at [address]    Labs  today    Make appointment with PHYSICL THERAPY  Let me know if pain does not improve        Follow up in one year for physical   Seek sooner medical attention if there is any worsening of symptoms or problems.           Patient Education   Preventive Care Advice   This is general advice given by our system to help you stay healthy. However, your care team may have specific advice just for you. Please talk to your care team about your preventive care needs.  Nutrition  Eat 5 or more servings of fruits and vegetables each day.  Try wheat bread, brown rice and whole grain pasta (instead of white bread, rice, and pasta).  Get enough calcium and vitamin D. Check the label on foods and aim for 100% of the RDA (recommended daily allowance).  Lifestyle  Exercise at least 150 minutes each week  (30 minutes a day, 5 days a week).  Do muscle strengthening activities 2 days a week. These help control your weight and prevent disease.  No smoking.  Wear sunscreen to prevent skin cancer.  Have a dental exam and cleaning every 6 months.  Yearly exams  See your health care team every year to talk about:  Any changes in your health.  Any medicines your care team has prescribed.  Preventive care, family planning, and ways to prevent chronic diseases.  Shots (vaccines)   HPV shots (up to age 26), if you've never had them before.  Hepatitis B shots (up to age 59), if you've never had them before.  COVID-19 shot: Get this shot when it's due.  Flu shot: Get a flu shot every year.  Tetanus shot: Get a tetanus shot every 10 years.  Pneumococcal, hepatitis A, and RSV shots: Ask your care team if you need these based on your risk.  Shingles shot (for age 50 and up)  General health tests  Diabetes screening:  Starting at age 35, Get screened for diabetes  at least every 3 years.  If you are younger than age 35, ask your care team if you should be screened for diabetes.  Cholesterol test: At age 39, start having a cholesterol test every 5 years, or more often if advised.  Bone density scan (DEXA): At age 50, ask your care team if you should have this scan for osteoporosis (brittle bones).  Hepatitis C: Get tested at least once in your life.  STIs (sexually transmitted infections)  Before age 24: Ask your care team if you should be screened for STIs.  After age 24: Get screened for STIs if you're at risk. You are at risk for STIs (including HIV) if:  You are sexually active with more than one person.  You don't use condoms every time.  You or a partner was diagnosed with a sexually transmitted infection.  If you are at risk for HIV, ask about PrEP medicine to prevent HIV.  Get tested for HIV at least once in your life, whether you are at risk for HIV or not.  Cancer screening tests  Cervical cancer screening: If you have a cervix, begin getting regular cervical cancer screening tests starting at age 21.  Breast cancer scan (mammogram): If you've ever had breasts, begin having regular mammograms starting at age 40. This is a scan to check for breast cancer.  Colon cancer screening: It is important to start screening for colon cancer at age 45.  Have a colonoscopy test every 10 years (or more often if you're at risk) Or, ask your provider about stool tests like a FIT test every year or Cologuard test every 3 years.  To learn more about your testing options, visit:   .  For help making a decision, visit:   https://bit.ly/po18479.  Prostate cancer screening test: If you have a prostate, ask your care team if a prostate cancer screening test (PSA) at age 55 is right for you.  Lung cancer screening: If you are a current or former smoker ages 50 to 80, ask your care team if ongoing lung cancer screenings are right for you.  For informational purposes only. Not to replace the  advice of your health care provider. Copyright   2023 [facility]. All rights reserved. Clinically reviewed by the [facility]. in3Dgallery 094325 - REV 01/24.  Learning About Stress  What is stress?     Stress is your body's response to a hard situation. Your body can have a physical, emotional, or mental response. Stress is a fact of life for most people, and it affects everyone differently. What causes stress for you may not be stressful for someone else.  A lot of things can cause stress. You may feel stress when you go on a job interview, take a test, or run a race. This kind of short-term stress is normal and even useful. It can help you if you need to work hard or react quickly. For example, stress can help you finish an important job on time.  Long-term stress is caused by ongoing stressful situations or events. Examples of long-term stress include long-term health problems, ongoing problems at work, or conflicts in your family. Long-term stress can harm your health.  How does stress affect your health?  When you are stressed, your body responds as though you are in danger. It makes hormones that speed up your heart, make you breathe faster, and give you a burst of energy. This is called the fight-or-flight stress response. If the stress is over quickly, your body goes back to normal and no harm is done.  But if stress happens too often or lasts too long, it can have bad effects. Long-term stress can make you more likely to get sick, and it can make symptoms of some diseases worse. If you tense up when you are stressed, you may develop neck, shoulder, or low back pain. Stress is linked to high blood pressure and heart disease.  Stress also harms your emotional health. It can make you root, tense, or depressed. Your relationships may suffer, and you may not do well at work or school.  What can you do to manage stress?  You can try these things to help manage stress:   Do  something active. Exercise or activity can help reduce stress. Walking is a great way to get started. Even everyday activities such as housecleaning or yard work can help.  Try yoga or campos chi. These techniques combine exercise and meditation. You may need some training at first to learn them.  Do something you enjoy. For example, listen to music or go to a movie. Practice your hobby or do volunteer work.  Meditate. This can help you relax, because you are not worrying about what happened before or what may happen in the future.  Do guided imagery. Imagine yourself in any setting that helps you feel calm. You can use online videos, books, or a teacher to guide you.  Do breathing exercises. For example:  From a standing position, bend forward from the waist with your knees slightly bent. Let your arms dangle close to the floor.  Breathe in slowly and deeply as you return to a standing position. Roll up slowly and lift your head last.  Hold your breath for just a few seconds in the standing position.  Breathe out slowly and bend forward from the waist.  Let your feelings out. Talk, laugh, cry, and express anger when you need to. Talking with supportive friends or family, a counselor, or a abraham leader about your feelings is a healthy way to relieve stress. Avoid discussing your feelings with people who make you feel worse.  Write. It may help to write about things that are bothering you. This helps you find out how much stress you feel and what is causing it. When you know this, you can find better ways to cope.  What can you do to prevent stress?  You might try some of these things to help prevent stress:  Manage your time. This helps you find time to do the things you want and need to do.  Get enough sleep. Your body recovers from the stresses of the day while you are sleeping.  Get support. Your family, friends, and community can make a difference in how you experience stress.  Limit your news feed. Avoid or limit  "time on social media or news that may make you feel stressed.  Do something active. Exercise or activity can help reduce stress. Walking is a great way to get started.  Where can you learn more?  Go to https://www.Oodrive.net/patiented  Enter N032 in the search box to learn more about \"Learning About Stress.\"  Current as of: October 24, 2023  Content Version: 14.3    2024 [employer].   Care instructions adapted under license by your healthcare professional. If you have questions about a medical condition or this instruction, always ask your healthcare professional. [employer] disclaims any warranty or liability for your use of this information.       "

## 2025-01-21 NOTE — RESULT ENCOUNTER NOTE
Luiz Penaloza    This is to inform you regarding your test result.    Your total cholesterol is normal.  HDL which is called good cholesterol is normal.  Your LDL cholesterol is normal.  This is often call bad cholesterol and high levels increase the risk for heart attacks and strokes.  Your triglycerides are normal.  TSH which is thyroid hormone is normal.  The testing of your blood sugar, kidney function, liver function and electrolytes was normal.  Your PSA (prostate cancer screening test)  is normal.            Sincerely,      Dr.Nasima Amalia MD,FACP

## 2025-01-22 ASSESSMENT — ACTIVITIES OF DAILY LIVING (ADL)
PLEASE_INDICATE_YOR_PRIMARY_REASON_FOR_REFERRAL_TO_THERAPY:: SHOULDER
AT_ITS_WORST?: 6
REMOVING_SOMETHING_FROM_YOUR_BACK_POCKET: 0
PUTTING_ON_AN_UNDERSHIRT_OR_A_PULLOVER_SWEATER: 5
PUTTING_ON_YOUR_PANTS: 2
PUSHING_WITH_THE_INVOLVED_ARM: 1
WASHING_YOUR_HAIR?: 2
WHEN_LYING_ON_THE_INVOLVED_SIDE: 2
TOUCHING_THE_BACK_OF_YOUR_NECK: 1
WASHING_YOUR_BACK: 2
PUTTING_ON_A_SHIRT_THAT_BUTTONS_DOWN_THE_FRONT: 0
REACHING_FOR_SOMETHING_ON_A_HIGH_SHELF: 4
CARRYING_A_HEAVY_OBJECT_OF_10_POUNDS: 0
PLACING_AN_OBJECT_ON_A_HIGH_SHELF: 3

## 2025-01-27 ENCOUNTER — THERAPY VISIT (OUTPATIENT)
Dept: PHYSICAL THERAPY | Facility: CLINIC | Age: 55
End: 2025-01-27
Attending: INTERNAL MEDICINE
Payer: COMMERCIAL

## 2025-01-27 DIAGNOSIS — M25.511 ACUTE PAIN OF RIGHT SHOULDER: ICD-10-CM

## 2025-01-27 PROCEDURE — 97161 PT EVAL LOW COMPLEX 20 MIN: CPT | Mod: GP

## 2025-01-27 PROCEDURE — 97110 THERAPEUTIC EXERCISES: CPT | Mod: GP

## 2025-01-27 NOTE — PROGRESS NOTES
PHYSICAL THERAPY EVALUATION  Type of Visit: Evaluation       Fall Risk Screen:  Fall screen completed by: PT  Have you fallen 2 or more times in the past year?: No  Have you fallen and had an injury in the past year?: No  Is patient a fall risk?: No    Subjective         Presenting condition or subjective complaint: Pain in right shoulder has gotten worse making some everyday activities painful.  Date of onset: 01/01/25    Relevant medical history:     Dates & types of surgery:      Prior diagnostic imaging/testing results:       Prior therapy history for the same diagnosis, illness or injury: No      Prior Level of Function  Transfers:   Ambulation:   ADL:   IADL:     Living Environment  Social support: With family members   Type of home: House; 2-story; Basement   Stairs to enter the home: Yes 6 Is there a railing: No     Ramp: No   Stairs inside the home: Yes 20 Is there a railing: Yes     Help at home: None  Equipment owned:       Employment: Yes IT  Hobbies/Interests:      Patient goals for therapy: Perform everyday activities without pain    Physical Therapist Assessment    KEY PT FINDINGS:  1) Strong and painful resisted testing of RUE with ABD and ER  2) Positive Neer's and HK Test on RUE  3) Delayed upward rotation on RUE for scapular kinematics    Signs and symptoms are consistent with subacromial pain syndrome.      Subjective History    Patient was referred by Carine Holland for Acute pain of right shoulder [M25.511]   Referring provider plan: Acute pain of right shoulder  -     Physical Therapy  Referral; Future  Pt reports right shoulder pain that has worsened in the last couple weeks. Pt reports taking shirt off hurts. Pt reports that it was injured in college but does not know what has caused pain to worsen recently. During exam I noticed very slight prominence on right shoulder with minimal swelling and I believe it is an issue with his rotator cuff. Pt was referred to physical therapy and  if symptoms do not improve pt can communicate with me and I can refer him to see a specialist.   Pt reports taking no NSAID or topical pain medication to manage this pain.  Pt received information for physical therapy,Pt advised that he can take ibuprofen, motrin or aleve or topical medication for pain.     PT Subjective:   Patient is a 54 year old male presenting to outpatient physical therapy with right shoulder pain. Has noticed a dull ache for the past month or so, and it seems like it's increasingly becoming more painful. Pain is located lateral shoulder in deltoid, noting that lifting overhead and rotating makes the pain worse. No specific NEAL, insidious onset. Denies any clicking, catching or locking. Denies any instability. Denies any true weakness in the right upper extremity. Denies any radiating pain. He does note in college he hurt his right shoulder water-skiing, was told he had shoulder impingement. Notices when he goes on his walks in the morning that he doesn't swing his right arm as much. Denies any numbness or tingling, denies any changes in circulation. Does note some stiffness.   Pain Level at Rest: 1/10  Pain Level with Use: 6/10  Pain Location: shoulder  Pain Quality: Sharp  Pain Frequency: intermittent  Pain is Worst: depending on activities  Pain is Exacerbated By: putting on shirt, overhead activities, rotating  Pain is Relieved By: rest  Pain Progression: Worsened     PMH:   Patient Active Problem List   Diagnosis    Family history of other cardiovascular diseases    Hyperlipidemia, unspecified hyperlipidemia type    Family history of malignant neoplasm of prostate    Flat feet, bilateral    Numerous moles    Elevated BP without diagnosis of hypertension    Ascending aorta dilatation    Tubular adenoma     Medications:   Current Outpatient Medications   Medication Sig Dispense Refill    ketoconazole (NIZORAL) 2 % external cream APPLY TOPICALLY DAILY TO THE FEET AFTER SHOWER, ON THE BOTTOMS  "AND SIDES AS WELL AS IN BETWEEN TOES. 60 g 5    rosuvastatin (CRESTOR) 10 MG tablet Take 1 tablet (10 mg) by mouth daily. for cholesterol 90 tablet 3    tazarotene (TAZORAC) 0.1 % external cream At bedtime to the feet. 60 g 3     No current facility-administered medications for this visit.           Imaging: None  Red Flag Screening: Negative  Prior Treatment Includes: None  Lifestyle History:  Occupation: Desk Job - IT  Experience with physical activity: Walking  General Health Assessment: NT.  Referral recommended? No  Additional Considerations: None     Patient Goals for Physical Therapy: \"Do normal regular activity without pain\"         Objective   Objective Examination    Posture/Observation: Protracted scapula R    Cervical/Thoracic Screen: WNL    Scapulothoraic Rhythm:   Hands at side: Protracted scapula R  Hands on hips: Protracted scapula R  Scaption: Early upward rotation R  Repeated Scaption: Early upward rotation R    Palpation:   + Tenderness At Location Left Right   Clavicle     Sternoclavicular     Acromioclavicular  +   Biceps  +   Triceps     Supraspinatus  +   Infraspinatus     Teres minor     Subscapularis     Deltoid     Levator     Rhomboids     Upper trap     Incisional     Bicipital groove         Shoulder: * if reproductive of patient's primary complaint   PROM L PROM R AROM L AROM R MMT L MMT R Resisted Isometrics   Flex   WNL Painful Arc  5/5 5/5    Abd   WNL Painful Arc  5/5 4/5*    Extension     5/5 5/5    Adduction          IR   T9 T9 5/5 5/5    ER   62 52 5/5 4/5*    Ext/IR          Lat. Dorsi             Middle trap             Lower trap                 Special tests: + if reproductive of patient's primary complaint   L R   Subacromial Pain     Neer's - +   Hawkin's-Clay - +   Vivienne/Empty Can     Felix's     Coracoid Impingement     Posterior Internal Impingement     Labral     [place]'s     Modified Dynamic Labral Shear     Crank     Instability     Apprehension " "Relocation (anterior)     Anterior Load & Shift     Posterior Load & Shift     Multi-Directional Instability      Sulcus     Biceps      Moodyrlana's     Upper Cut     Speed's     Rotator Cuff Tear or Tendinopathy     Drop Arm     Belly Press     Lift off      ER Lag      Full Can     AC Joint     Cross Over     Shear/Compression     [place]'s     Scapular Dysfunction     Scapular Assistance Test     Scapular Retraction Test     Low Row     Flip Sign     Wall Push          GH Mobility  L  R   Posterior glide     Inferior glide     Anterior glide     ACJ Mobility L R        SCJ Mobility L R          Beighton Index (4/9 or more \"+\"): N/A      Assessment & Plan   CLINICAL IMPRESSIONS  Medical Diagnosis: Acute pain of right shoulder    Treatment Diagnosis: R Subacromial Pain Syndrome   Impression/Assessment: Patient is a 54 year old male with right shoulder complaints.  The following significant findings have been identified: Pain, Decreased ROM/flexibility, Decreased joint mobility, Decreased strength, Impaired muscle performance, Decreased activity tolerance, and Impaired posture. These impairments interfere with their ability to perform self care tasks, work tasks, recreational activities, household chores, driving , household mobility, and community mobility as compared to previous level of function.     Clinical Decision Making (Complexity):  Clinical Presentation: Stable/Uncomplicated  Clinical Presentation Rationale: based on medical and personal factors listed in PT evaluation  Clinical Decision Making (Complexity): Low complexity    PLAN OF CARE  Treatment Interventions:  Modalities: Dry Needling, E-stim, Hot Pack  Interventions: Manual Therapy, Neuromuscular Re-education, Therapeutic Activity, Therapeutic Exercise, Self-Care/Home Management    Long Term Goals     PT Goal 1  Goal Identifier: LTG1  Goal Description: Patient will be able to lift right arm overhead to 160 degrees with 0/10 pain  Rationale: to " maximize safety and independence with performance of ADLs and functional tasks;to maximize safety and independence within the home;to maximize safety and independence within the community;to maximize safety and independence with transportation;to maximize safety and independence with self cares  Target Date: 03/24/25  PT Goal 2  Goal Identifier: LTG2  Goal Description: Patient will report an increase of at least 8 on the SPADI to demonstrate MCID  Rationale: to maximize safety and independence with performance of ADLs and functional tasks;to maximize safety and independence within the home;to maximize safety and independence within the community;to maximize safety and independence with transportation;to maximize safety and independence with self cares  Target Date: 03/24/25      Frequency of Treatment: 1x/2wks  Duration of Treatment: 8 weeks    Recommended Referrals to Other Professionals:   Education Assessment:   Learner/Method: Patient    Risks and benefits of evaluation/treatment have been explained.   Patient/Family/caregiver agrees with Plan of Care.     Evaluation Time:     PT Eval, Low Complexity Minutes (59479): 20       Signing Clinician: Tao Galvez PT

## 2025-01-28 ENCOUNTER — HOSPITAL ENCOUNTER (OUTPATIENT)
Dept: CARDIOLOGY | Facility: CLINIC | Age: 55
Discharge: HOME | End: 2025-01-28
Attending: INTERNAL MEDICINE
Payer: COMMERCIAL

## 2025-01-28 DIAGNOSIS — I77.810 ASCENDING AORTA DILATATION: ICD-10-CM

## 2025-01-28 LAB — LVEF ECHO: NORMAL

## 2025-01-28 PROCEDURE — 999N000208 ECHOCARDIOGRAM COMPLETE

## 2025-01-28 PROCEDURE — C8929 TTE W OR WO FOL WCON,DOPPLER: HCPCS

## 2025-01-28 PROCEDURE — 93306 TTE W/DOPPLER COMPLETE: CPT | Mod: 26 | Performed by: INTERNAL MEDICINE

## 2025-01-28 PROCEDURE — 255N000002 HC RX 255 OP 636: Performed by: INTERNAL MEDICINE

## 2025-01-28 RX ADMIN — HUMAN ALBUMIN MICROSPHERES AND PERFLUTREN 9 ML: 10; .22 INJECTION, SOLUTION INTRAVENOUS at 09:12

## 2025-01-28 NOTE — LETTER
2025      Jeronimo Esparza  [address]  [facility] [address]        Dear ,    We are writing to inform you of your test results.    Echocardiogram result is satisfactory .   It shows good contractility of heart.       Resulted Orders   Echocardiogram Complete   Result Value Ref Range    LVEF  55-60%     [facility]    [id number]  [id number]  [id number]  [id number]^SOOMAR^RACHAEL^R     [facility]  [facility]  Echocardiography Laboratory  [address]  [address]  [place] [address]  Phone [phone]  Fax [phone]     Name: JERONIMO ESPARZA  MRN: [id number]  : 1970  Study Date: 2025 08:31 AM  Age: 54 yrs  Gender: Male  Patient Location: [facility]  Reason For Study: Ascending aorta dilatation  Ordering Physician: RACHAEL CM  Referring Physician: RACHAEL CM  Performed By: Leobardo Stahl     BSA: 2.2 m2  Height: 72 in  Weight: 209 lb  HR: 79  BP: 121/76 mmHg  ______________________________________________________________________________  ______________________________________________________________________________  Interpretation Summary     Left ventricular systolic function is normal.  The visual ejection fraction is 55-60%.  No regional wall motion abnormalities noted.  Ascending aorta measures 39 mm.  The study was technically adequate.  ______________________________________________________________________________  Left Ventricle  The left ventricle is normal in size. There is normal left ventricular wall  thickness. Left ventricular systolic function is normal. The visual ejection  fraction is 55-60%. Diastolic Doppler findings (E/E' ratio and/or other  parameters) suggest left ventricular filling pressures are normal. No regional  wall motion abnormalities noted.     Right Ventricle  The right ventricle is normal size. The right ventricular systolic function is  normal.     Atria  Normal left atrial size. Right atrial size is  normal.     Mitral Valve  The mitral valve leaflets are mildly thickened. There is trace mitral  regurgitation.     Tricuspid Valve  There is trace tricuspid regurgitation. Right ventricular systolic pressure  could not be approximated due to inadequate tricuspid regurgitation. IVC  diameter <2.1 cm collapsing >50% with sniff suggests a normal RA pressure of 3  mmHg.     Aortic Valve  There is mild trileaflet aortic sclerosis. There is trace aortic  regurgitation. No aortic stenosis is present.     Pulmonic Valve  The pulmonic valve is not well visualized.     Vessels  The aortic root is normal size. Ascending aorta measures 39 mm.     Pericardium  There is no pericardial effusion.     Rhythm  Sinus rhythm was noted.     ______________________________________________________________________________  MMode/2D Measurements & Calculations  IVSd: 0.97 cm  LVIDd: 4.5 cm  LVIDs: 2.8 cm  LVPWd: 0.91 cm  FS: 37.1 %  LV mass(C)d: 139.4 grams  LV mass(C)dI: 64.2 grams/m2  Ao root diam: 3.5 cm  LA dimension: 3.5 cm     asc Aorta Diam: 3.9 cm  LA/Ao: 1.0  LVOT diam: 2.4 cm  LVOT area: 4.5 cm2  Ao root diam index Ht(cm/m): 1.9  Ao root diam index BSA (cm/m2): 1.6  Asc Ao diam index BSA (cm/m2): 1.8  Asc Ao diam index Ht(cm/m): 2.1  LA Volume (BP): 30.9 ml  LA Volume Index (BP): 14.2 ml/m2  RV Base: 3.6 cm  RWT: 0.41     TAPSE: 2.5 cm     Doppler Measurements & Calculations  MV E max prateek: 77.2 cm/sec  MV A max prateek: 73.9 cm/sec  MV E/A: 1.0  MV dec slope: 362.0 cm/sec2  MV dec time: 0.21 sec  Ao V2 max: 114.0 cm/sec  Ao max P.0 mmHg  Ao V2 mean: 77.6 cm/sec  Ao mean PG: 3.0 mmHg  Ao V2 VTI: 21.7 cm  ADRIAN(I,D): 4.3 cm2  ADRIAN(V,D): 4.2 cm2  LV V1 max P.6 mmHg  LV V1 max: 107.0 cm/sec  LV V1 VTI: 20.4 cm  SV(LVOT): 92.3 ml  SI(LVOT): 42.5 ml/m2  PA acc time: 0.11 sec  AV Prateek Ratio (DI): 0.94  ADRIAN Index (cm2/m2): 2.0  E/E' av.7  Lateral E/e': 5.4  Medial E/e': 8.1  RV S Prateek: 15.2 cm/sec      ______________________________________________________________________________  Report approved by: German Steve MD on 01/28/2025 09:18 AM             If you have any questions or concerns, please call the clinic at the number listed above.       Sincerely,      Carine Holland MD    Electronically signed

## 2025-01-29 NOTE — RESULT ENCOUNTER NOTE
Please notify patient by sending following letter with copy of test results      Gradylo Tremaine,    This is to inform you regarding your test result.    Echocardiogram result is satisfactory .  It shows good contractility of heart.    Sincerely,      Dr.Nasima Amalia MD,FACP

## 2025-01-30 ENCOUNTER — OFFICE VISIT (OUTPATIENT)
Dept: ORTHOPEDICS | Facility: CLINIC | Age: 55
End: 2025-01-30
Attending: INTERNAL MEDICINE
Payer: COMMERCIAL

## 2025-01-30 ENCOUNTER — ANCILLARY PROCEDURE (OUTPATIENT)
Dept: GENERAL RADIOLOGY | Facility: CLINIC | Age: 55
End: 2025-01-30
Attending: FAMILY MEDICINE
Payer: COMMERCIAL

## 2025-01-30 ENCOUNTER — PRE VISIT (OUTPATIENT)
Dept: ORTHOPEDICS | Facility: CLINIC | Age: 55
End: 2025-01-30

## 2025-01-30 DIAGNOSIS — M25.511 RIGHT SHOULDER PAIN, UNSPECIFIED CHRONICITY: Primary | ICD-10-CM

## 2025-01-30 DIAGNOSIS — M25.511 ACUTE PAIN OF RIGHT SHOULDER: ICD-10-CM

## 2025-01-30 DIAGNOSIS — M25.511 RIGHT SHOULDER PAIN, UNSPECIFIED CHRONICITY: ICD-10-CM

## 2025-01-30 NOTE — TELEPHONE ENCOUNTER
DIAGNOSIS: right shoulder pain/ [facility] @ [facility]/ no imaging, no prior surgery/ [employer]     APPOINTMENT DATE: 1.30.25   NOTES STATUS DETAILS   OFFICE NOTE from referring provider Internal 1.20.25  Amalia MYERS   MEDICATION LIST Internal

## 2025-01-30 NOTE — PROGRESS NOTES
"ASSESSMENT/PLAN:    ***      Pt is a 54 year old male here today for:     HPI:   Right Shoulder pain:   Location: Lateral aspect of the shoulder    Duration: 2-3 months    Injury? Inciting activity? No specific NEAL    Radiation: No    Numbness/tingling? No    Weakness? No    Instability? No    Clicking/ catching? No    Imaging? XR on 1/30/25   Treatment? PT;   Pain with flexion/abduction; changing clothes  Per Gera-ITt message from patient to PCP this morning:  \"Good morning. I went to the physical therapist earlier this week and got a series of exercises for my shoulder. I've been doing the exercises, but within the last day the shoulder pain has increased significantly. There is constant pain in my shoulder (maybe 4-5 on the pain scale) and I can't lift my arm more than a few inches off my leg without significant pain. I've been taking Aleve, which helps, but the pain comes right back. Sleeping last night was challenging. I let the physical therapist know and he suggested continuing with most of the exercises, but scaling them back. With the level of pain, I'm not sure I can even do the exercises anymore. Wondering if you have suggestions on what to do about the pain? -Tremaine\"    Per last PT note on 1/27/25:     KEY PT FINDINGS:  1) Strong and painful resisted testing of RUE with ABD and ER  2) Positive Neer's and HK Test on RUE  3) Delayed upward rotation on RUE for scapular kinematics     Signs and symptoms are consistent with subacromial pain syndrome.    Per PCP note on 1/20/25:  Acute pain of right shoulder  -     Physical Therapy  Referral; Future  Pt reports right shoulder pain that has worsened in the last couple weeks. Pt reports taking shirt off hurts. Pt reports that it was injured in college but does not know what has caused pain to worsen recently. During exam I noticed very slight prominence on right shoulder with minimal swelling and I believe it is an issue with his rotator cuff. Pt was " referred to physical therapy and if symptoms do not improve pt can communicate with me and I can refer him to see a specialist.   Pt reports taking no NSAID or topical pain medication to manage this pain.  Pt received information for physical therapy,Pt advised that he can take ibuprofen, motrin or aleve or topical medication for pain.     Past Medical History:   Diagnosis Date    FAMILY HX CARDIOVAS DIS NEC 5/6/2006    FAMILY HX PROSTATIC MALIGNANCY 9/14/2007    Lumbago 5/6/2006    Mixed hyperlipidemia 5/6/2006    ORCHITIS/EPIDIDYMIT-MILD-RESOLVED 9/14/2007      Past Surgical History:   Procedure Laterality Date    COLONOSCOPY N/A 3/4/2024    Procedure: COLONOSCOPY, FLEXIBLE, WITH LESION REMOVAL USING SNARE;  Surgeon: Vane Ulloa MD;  Location:  GI    HC TOOTH EXTRACTION W/FORCEP  age 17    no bleeding      Current Outpatient Medications   Medication Sig Dispense Refill    ketoconazole (NIZORAL) 2 % external cream APPLY TOPICALLY DAILY TO THE FEET AFTER SHOWER, ON THE BOTTOMS AND SIDES AS WELL AS IN BETWEEN TOES. 60 g 5    rosuvastatin (CRESTOR) 10 MG tablet Take 1 tablet (10 mg) by mouth daily. for cholesterol 90 tablet 3    tazarotene (TAZORAC) 0.1 % external cream At bedtime to the feet. 60 g 3    traMADol (ULTRAM) 50 MG tablet Take 1 tablet (50 mg) by mouth 2 times daily as needed for severe pain. 10 tablet 0      Allergies   Allergen Reactions    No Known Allergies       ROS:   Gen- no fevers/chills   Rheum - no morning stiffness   Derm - no rash/ redness   Neuro - no numbness, no tingling   Remainder of ROS negative.     Exam:   There were no vitals taken for this visit.     R Shoulder:   Inspection: asymmetry? ***; dyskinesis? ***   ROM:   Active - forward flexion - full/ abduction - full/ int rot - symmetric/ ext rot - symmetric   Passive- forward flexion - full/ abduction - full   Strength: deltoid/supraspinatous - 5/5; infraspinatous/ teres minor - 5/5; subscapularis - 5/5   Maneuvers:   RTC -  empty can - neg; painful arc - neg; lift off - neg   Impingement - Shelby -neg; Neer- neg   AC - cross arm - neg   Biceps - Speed's - neg; Yergason's - neg   Labrum - Cole's - neg; Efrris shear - neg   Instability - Apprehension - neg   Palpation: AC - neg; Acromion/ supraspinatus - neg; scapula - neg; bicipital groove - neg       Large Joint Injection/Arthocentesis: R subacromial bursa    Date/Time: 1/30/2025 3:57 PM    Performed by: Pancho Ibarra MD  Authorized by: Pancho Ibarra MD    Indications:  Pain  Needle Size:  22 G  Guidance: landmark guided    Approach:  Posterior  Location:  Shoulder      Site:  R subacromial bursa  Medications:  40 mg triamcinolone 40 MG/ML; 4 mL lidocaine (PF) 1 %  Outcome:  Tolerated well, no immediate complications  Procedure discussed: discussed risks, benefits, and alternatives    Consent Given by:  Patient  Timeout: timeout called immediately prior to procedure    Prep: patient was prepped and draped in usual sterile fashion

## 2025-01-30 NOTE — NURSING NOTE
[address] [address]  Dept: [phone]  ______________________________________________________________________________    Patient: Tremaine Esparza   : 1970   MRN: [id number]   2025    INVASIVE PROCEDURE SAFETY CHECKLIST    Date: 2025    Procedure: Right shoulder subacromial CSI   Patient Name: Tremaine Esparza  MRN: [id number]  YOB: 1970    Action: Complete sections as appropriate. Any discrepancy results in a HARD COPY until resolved.     PRE PROCEDURE:  Patient ID verified with 2 identifiers (name and  or MRN): Yes  Procedure and site verified with patient/designee (when able): Yes  Accurate consent documentation in medical record: Yes  H&P (or appropriate assessment) documented in medical record: Yes  H&P must be up to 20 days prior to procedure and updates within 24 hours of procedure as applicable: NA  Relevant diagnostic and radiology test results appropriately labeled and displayed as applicable: Yes  Procedure site(s) marked with provider initials: NA    TIMEOUT:  Time-Out performed immediately prior to starting procedure, including verbal and active participation of all team members addressing the following:Yes  * Correct patient identify  * Confirmed that the correct side and site are marked  * An accurate procedure consent form  * Agreement on the procedure to be done  * Correct patient position  * Relevant images and results are properly labeled and appropriately displayed  * The need to administer antibiotics or fluids for irrigation purposes during the procedure as applicable   * Safety precautions based on patient history or medication use    DURING PROCEDURE: Verification of correct person, site, and procedures any time the responsibility for care of the patient is transferred to another member of the care team.       Prior to injection, verified patient identity using patient's name and date of  birth.  Due to injection administration, patient instructed to remain in clinic for 15 minutes  afterwards, and to report any adverse reaction to me immediately.    Bursa injection was performed.      Drug Amount Wasted:  Yes: 1 mg/ml   Vial/Syringe: Single dose vial  Expiration Date:  07/28       Alyce Lemus [facility]  January 30, 2025

## 2025-02-24 ENCOUNTER — THERAPY VISIT (OUTPATIENT)
Dept: PHYSICAL THERAPY | Facility: CLINIC | Age: 55
End: 2025-02-24
Payer: COMMERCIAL

## 2025-02-24 DIAGNOSIS — M25.511 ACUTE PAIN OF RIGHT SHOULDER: Primary | ICD-10-CM

## 2025-02-24 PROCEDURE — 97110 THERAPEUTIC EXERCISES: CPT | Mod: GP

## 2025-03-03 ENCOUNTER — OFFICE VISIT (OUTPATIENT)
Dept: ORTHOPEDICS | Facility: CLINIC | Age: 55
End: 2025-03-03
Payer: COMMERCIAL

## 2025-03-03 DIAGNOSIS — M75.01 ADHESIVE CAPSULITIS OF RIGHT SHOULDER: Primary | ICD-10-CM

## 2025-03-03 PROCEDURE — 1126F AMNT PAIN NOTED NONE PRSNT: CPT | Performed by: FAMILY MEDICINE

## 2025-03-03 PROCEDURE — 99212 OFFICE O/P EST SF 10 MIN: CPT | Mod: GC | Performed by: FAMILY MEDICINE

## 2025-03-03 NOTE — PROGRESS NOTES
ASSESSMENT/PLAN:    (M75.01) Adhesive capsulitis of right shoulder  (primary encounter diagnosis)  Comment:   Plan: resolved; will continue home program and follow-up prn    Attestation:  This patient has been seen and evaluated by me, Pancho Ibarra MD with the resident, Dr Hernandez and the care team. I agree with the findings and plan of care as documented in this note.    Pancho Ibarra MD  March 3, 2025  9:27 AM      Pt is a 54 year old male last seen on 1/30/25 here for follow up of:     R shoulder - patient was last seen on 1/30/25 and plan at that time included a subacromial CSI and physical therapy. He reports that the injection helped reduce his pain. He has been compliant with PT and his HEP.     Per my last note:  (M75.01) Adhesive capsulitis of right shoulder  (primary encounter diagnosis)  Comment: exam, imaging consistent w/ adhesive capsulitis; would hold on PT for now; subacromial cortisone today; f/up in 4-6 wks; if no better, consider guided GH injection; precautions given  Plan: triamcinolone (KENALOG-40) 40 MG/ML injection,         lidocaine (PF) (XYLOCAINE) 1 % injection, Large        Joint Injection/Arthocentesis: R subacromial bursa          (M25.511) Acute pain of right shoulder  Comment:   Plan: see above    Past Medical History:   Diagnosis Date    FAMILY HX CARDIOVAS DIS NEC 5/6/2006    FAMILY HX PROSTATIC MALIGNANCY 9/14/2007    Lumbago 5/6/2006    Mixed hyperlipidemia 5/6/2006    ORCHITIS/EPIDIDYMIT-MILD-RESOLVED 9/14/2007      Current Outpatient Medications   Medication Sig Dispense Refill    ketoconazole (NIZORAL) 2 % external cream APPLY TOPICALLY DAILY TO THE FEET AFTER SHOWER, ON THE BOTTOMS AND SIDES AS WELL AS IN BETWEEN TOES. 60 g 5    rosuvastatin (CRESTOR) 10 MG tablet Take 1 tablet (10 mg) by mouth daily. for cholesterol 90 tablet 3    tazarotene (TAZORAC) 0.1 % external cream At bedtime to the feet. 60 g 3    traMADol (ULTRAM) 50 MG tablet Take 1 tablet (50 mg) by mouth 2 times daily  as needed for severe pain. (Patient not taking: Reported on 1/30/2025) 10 tablet 0      Allergies   Allergen Reactions    No Known Allergies       ROS:   Gen- no fevers/chills   Rheum - no morning stiffness   Derm - no rash/ redness   Neuro - no numbness, no tingling   Remainder of ROS negative.     Exam:   Neurovascularly intact  AROM:  Abduction/adduction wnl  Ext/flexion wnl   Crossover wnl  Passive ER - wnl, R mildly limited compared to L.   Strength in 4 planes 5/5

## 2025-03-03 NOTE — LETTER
3/3/2025      RE: Tremaine Esparza  [address]  [facility] [address]     Dear Colleague,    Thank you for referring your patient, Tremaine Esparza, to the [facility] [place]. Please see a copy of my visit note below.    ASSESSMENT/PLAN:    (M75.01) Adhesive capsulitis of right shoulder  (primary encounter diagnosis)  Comment:   Plan: resolved; will continue home program and follow-up prn    Attestation:  This patient has been seen and evaluated by me, Pancho Ibarra MD with the resident, Dr Hernandez and the care team. I agree with the findings and plan of care as documented in this note.    Pancho Ibarra MD  March 3, 2025  9:27 AM      Pt is a 54 year old male last seen on 1/30/25 here for follow up of:     R shoulder - patient was last seen on 1/30/25 and plan at that time included a subacromial CSI and physical therapy. He reports that the injection helped reduce his pain. He has been compliant with PT and his HEP.     Per my last note:  (M75.01) Adhesive capsulitis of right shoulder  (primary encounter diagnosis)  Comment: exam, imaging consistent w/ adhesive capsulitis; would hold on PT for now; subacromial cortisone today; f/up in 4-6 wks; if no better, consider guided GH injection; precautions given  Plan: triamcinolone (KENALOG-40) 40 MG/ML injection,         lidocaine (PF) (XYLOCAINE) 1 % injection, Large        Joint Injection/Arthocentesis: R subacromial bursa          (M25.511) Acute pain of right shoulder  Comment:   Plan: see above    Past Medical History:   Diagnosis Date     FAMILY HX CARDIOVAS DIS NEC 5/6/2006     FAMILY HX PROSTATIC MALIGNANCY 9/14/2007     Lumbago 5/6/2006     Mixed hyperlipidemia 5/6/2006     ORCHITIS/EPIDIDYMIT-MILD-RESOLVED 9/14/2007      Current Outpatient Medications   Medication Sig Dispense Refill     ketoconazole (NIZORAL) 2 % external cream APPLY TOPICALLY DAILY TO THE FEET AFTER SHOWER, ON THE BOTTOMS AND SIDES AS WELL AS IN BETWEEN TOES. 60  g 5     rosuvastatin (CRESTOR) 10 MG tablet Take 1 tablet (10 mg) by mouth daily. for cholesterol 90 tablet 3     tazarotene (TAZORAC) 0.1 % external cream At bedtime to the feet. 60 g 3     traMADol (ULTRAM) 50 MG tablet Take 1 tablet (50 mg) by mouth 2 times daily as needed for severe pain. (Patient not taking: Reported on 1/30/2025) 10 tablet 0      Allergies   Allergen Reactions     No Known Allergies       ROS:   Gen- no fevers/chills   Rheum - no morning stiffness   Derm - no rash/ redness   Neuro - no numbness, no tingling   Remainder of ROS negative.     Exam:   Neurovascularly intact  AROM:  Abduction/adduction wnl  Ext/flexion wnl   Crossover wnl  Passive ER - wnl, R mildly limited compared to L.   Strength in 4 planes 5/5       Again, thank you for allowing me to participate in the care of your patient.      Sincerely,    Pancho Ibarra MD

## 2025-08-13 ENCOUNTER — OFFICE VISIT (OUTPATIENT)
Dept: DERMATOLOGY | Facility: CLINIC | Age: 55
End: 2025-08-13
Attending: PHYSICIAN ASSISTANT
Payer: COMMERCIAL

## 2025-08-13 DIAGNOSIS — Z12.83 SKIN CANCER SCREENING: ICD-10-CM

## 2025-08-13 DIAGNOSIS — B35.3 TINEA PEDIS OF BOTH FEET: ICD-10-CM

## 2025-08-13 DIAGNOSIS — D22.9 NUMEROUS MOLES: ICD-10-CM

## 2025-08-13 DIAGNOSIS — B35.1 ONYCHOMYCOSIS: Primary | ICD-10-CM

## 2025-08-13 DIAGNOSIS — D22.9 MULTIPLE PIGMENTED NEVI: ICD-10-CM

## 2025-08-13 DIAGNOSIS — L82.1 SEBORRHEIC KERATOSES: ICD-10-CM

## 2025-08-13 DIAGNOSIS — D18.01 CHERRY ANGIOMA: ICD-10-CM

## 2025-08-13 DIAGNOSIS — L81.4 SOLAR LENTIGINOSIS: ICD-10-CM

## 2025-08-13 DIAGNOSIS — Q82.5: ICD-10-CM

## 2025-08-13 RX ORDER — TERBINAFINE HYDROCHLORIDE 250 MG/1
250 TABLET ORAL DAILY
Qty: 90 TABLET | Refills: 0 | Status: SHIPPED | OUTPATIENT
Start: 2025-08-13 | End: 2025-11-11

## 2025-08-13 RX ORDER — KETOCONAZOLE 20 MG/G
CREAM TOPICAL DAILY
Qty: 60 G | Refills: 5 | Status: SHIPPED | OUTPATIENT
Start: 2025-08-13

## 2025-08-13 ASSESSMENT — PAIN SCALES - GENERAL: PAINLEVEL_OUTOF10: NO PAIN (0)

## 2025-08-26 ENCOUNTER — NURSE TRIAGE (OUTPATIENT)
Dept: FAMILY MEDICINE | Facility: CLINIC | Age: 55
End: 2025-08-26
Payer: COMMERCIAL

## (undated) RX ORDER — FENTANYL CITRATE 50 UG/ML
INJECTION, SOLUTION INTRAMUSCULAR; INTRAVENOUS
Status: DISPENSED
Start: 2024-03-04

## (undated) RX ORDER — LIDOCAINE HYDROCHLORIDE 10 MG/ML
INJECTION, SOLUTION EPIDURAL; INFILTRATION; INTRACAUDAL; PERINEURAL
Status: DISPENSED
Start: 2025-01-30

## (undated) RX ORDER — TRIAMCINOLONE ACETONIDE 40 MG/ML
INJECTION, SUSPENSION INTRA-ARTICULAR; INTRAMUSCULAR
Status: DISPENSED
Start: 2025-01-30